# Patient Record
Sex: FEMALE | Race: ASIAN | NOT HISPANIC OR LATINO | URBAN - METROPOLITAN AREA
[De-identification: names, ages, dates, MRNs, and addresses within clinical notes are randomized per-mention and may not be internally consistent; named-entity substitution may affect disease eponyms.]

---

## 2024-01-12 PROBLEM — Z00.00 ENCOUNTER FOR PREVENTIVE HEALTH EXAMINATION: Status: ACTIVE | Noted: 2024-01-12

## 2024-01-19 DIAGNOSIS — Z86.39 PERSONAL HISTORY OF OTHER ENDOCRINE, NUTRITIONAL AND METABOLIC DISEASE: ICD-10-CM

## 2024-01-22 ENCOUNTER — OUTPATIENT (OUTPATIENT)
Dept: OUTPATIENT SERVICES | Facility: HOSPITAL | Age: 89
LOS: 1 days | End: 2024-01-22
Payer: MEDICARE

## 2024-01-22 ENCOUNTER — APPOINTMENT (OUTPATIENT)
Dept: CT IMAGING | Facility: HOSPITAL | Age: 89
End: 2024-01-22
Payer: MEDICARE

## 2024-01-22 ENCOUNTER — APPOINTMENT (OUTPATIENT)
Dept: CARDIOTHORACIC SURGERY | Facility: CLINIC | Age: 89
End: 2024-01-22
Payer: MEDICARE

## 2024-01-22 ENCOUNTER — NON-APPOINTMENT (OUTPATIENT)
Age: 89
End: 2024-01-22

## 2024-01-22 ENCOUNTER — RESULT REVIEW (OUTPATIENT)
Age: 89
End: 2024-01-22

## 2024-01-22 ENCOUNTER — APPOINTMENT (OUTPATIENT)
Dept: ULTRASOUND IMAGING | Facility: HOSPITAL | Age: 89
End: 2024-01-22

## 2024-01-22 VITALS
TEMPERATURE: 97.4 F | SYSTOLIC BLOOD PRESSURE: 159 MMHG | DIASTOLIC BLOOD PRESSURE: 67 MMHG | HEIGHT: 59 IN | OXYGEN SATURATION: 98 % | HEART RATE: 83 BPM | BODY MASS INDEX: 23.99 KG/M2 | WEIGHT: 119 LBS | RESPIRATION RATE: 15 BRPM

## 2024-01-22 DIAGNOSIS — Z78.9 OTHER SPECIFIED HEALTH STATUS: ICD-10-CM

## 2024-01-22 DIAGNOSIS — Z86.59 PERSONAL HISTORY OF OTHER MENTAL AND BEHAVIORAL DISORDERS: ICD-10-CM

## 2024-01-22 DIAGNOSIS — M19.90 UNSPECIFIED OSTEOARTHRITIS, UNSPECIFIED SITE: ICD-10-CM

## 2024-01-22 DIAGNOSIS — Z86.79 PERSONAL HISTORY OF OTHER DISEASES OF THE CIRCULATORY SYSTEM: ICD-10-CM

## 2024-01-22 LAB
ALBUMIN SERPL ELPH-MCNC: 3.9 G/DL — SIGNIFICANT CHANGE UP (ref 3.3–5)
ALP SERPL-CCNC: 35 U/L — LOW (ref 40–120)
ALT FLD-CCNC: 15 U/L — SIGNIFICANT CHANGE UP (ref 10–45)
ANION GAP SERPL CALC-SCNC: 10 MMOL/L — SIGNIFICANT CHANGE UP (ref 5–17)
APTT BLD: 28.6 SEC — SIGNIFICANT CHANGE UP (ref 24.5–35.6)
AST SERPL-CCNC: 24 U/L — SIGNIFICANT CHANGE UP (ref 10–40)
BASOPHILS # BLD AUTO: 0.04 K/UL — SIGNIFICANT CHANGE UP (ref 0–0.2)
BASOPHILS NFR BLD AUTO: 0.6 % — SIGNIFICANT CHANGE UP (ref 0–2)
BILIRUB SERPL-MCNC: 0.6 MG/DL — SIGNIFICANT CHANGE UP (ref 0.2–1.2)
BUN SERPL-MCNC: 20 MG/DL — SIGNIFICANT CHANGE UP (ref 7–23)
CALCIUM SERPL-MCNC: 9.7 MG/DL — SIGNIFICANT CHANGE UP (ref 8.4–10.5)
CHLORIDE SERPL-SCNC: 106 MMOL/L — SIGNIFICANT CHANGE UP (ref 96–108)
CO2 SERPL-SCNC: 25 MMOL/L — SIGNIFICANT CHANGE UP (ref 22–31)
CREAT SERPL-MCNC: 1.14 MG/DL — SIGNIFICANT CHANGE UP (ref 0.5–1.3)
EGFR: 47 ML/MIN/1.73M2 — LOW
EOSINOPHIL # BLD AUTO: 0.08 K/UL — SIGNIFICANT CHANGE UP (ref 0–0.5)
EOSINOPHIL NFR BLD AUTO: 1.3 % — SIGNIFICANT CHANGE UP (ref 0–6)
GLUCOSE SERPL-MCNC: 115 MG/DL — HIGH (ref 70–99)
HCT VFR BLD CALC: 32.4 % — LOW (ref 34.5–45)
HGB BLD-MCNC: 10.7 G/DL — LOW (ref 11.5–15.5)
IMM GRANULOCYTES NFR BLD AUTO: 0.8 % — SIGNIFICANT CHANGE UP (ref 0–0.9)
INR BLD: 0.95 — SIGNIFICANT CHANGE UP (ref 0.85–1.18)
LYMPHOCYTES # BLD AUTO: 1.61 K/UL — SIGNIFICANT CHANGE UP (ref 1–3.3)
LYMPHOCYTES # BLD AUTO: 26.1 % — SIGNIFICANT CHANGE UP (ref 13–44)
MCHC RBC-ENTMCNC: 31.6 PG — SIGNIFICANT CHANGE UP (ref 27–34)
MCHC RBC-ENTMCNC: 33 GM/DL — SIGNIFICANT CHANGE UP (ref 32–36)
MCV RBC AUTO: 95.6 FL — SIGNIFICANT CHANGE UP (ref 80–100)
MONOCYTES # BLD AUTO: 0.51 K/UL — SIGNIFICANT CHANGE UP (ref 0–0.9)
MONOCYTES NFR BLD AUTO: 8.3 % — SIGNIFICANT CHANGE UP (ref 2–14)
NEUTROPHILS # BLD AUTO: 3.88 K/UL — SIGNIFICANT CHANGE UP (ref 1.8–7.4)
NEUTROPHILS NFR BLD AUTO: 62.9 % — SIGNIFICANT CHANGE UP (ref 43–77)
NRBC # BLD: 0 /100 WBCS — SIGNIFICANT CHANGE UP (ref 0–0)
NT-PROBNP SERPL-SCNC: 551 PG/ML — HIGH (ref 0–300)
PLATELET # BLD AUTO: 169 K/UL — SIGNIFICANT CHANGE UP (ref 150–400)
POTASSIUM SERPL-MCNC: 4.1 MMOL/L — SIGNIFICANT CHANGE UP (ref 3.5–5.3)
POTASSIUM SERPL-SCNC: 4.1 MMOL/L — SIGNIFICANT CHANGE UP (ref 3.5–5.3)
PROT SERPL-MCNC: 7 G/DL — SIGNIFICANT CHANGE UP (ref 6–8.3)
PROTHROM AB SERPL-ACNC: 10.8 SEC — SIGNIFICANT CHANGE UP (ref 9.5–13)
RBC # BLD: 3.39 M/UL — LOW (ref 3.8–5.2)
RBC # FLD: 12.7 % — SIGNIFICANT CHANGE UP (ref 10.3–14.5)
SODIUM SERPL-SCNC: 141 MMOL/L — SIGNIFICANT CHANGE UP (ref 135–145)
WBC # BLD: 6.17 K/UL — SIGNIFICANT CHANGE UP (ref 3.8–10.5)
WBC # FLD AUTO: 6.17 K/UL — SIGNIFICANT CHANGE UP (ref 3.8–10.5)

## 2024-01-22 PROCEDURE — 75573 CT HRT C+ STRUX CGEN HRT DS: CPT

## 2024-01-22 PROCEDURE — 71275 CT ANGIOGRAPHY CHEST: CPT | Mod: 26,MH

## 2024-01-22 PROCEDURE — 86901 BLOOD TYPING SEROLOGIC RH(D): CPT

## 2024-01-22 PROCEDURE — 85610 PROTHROMBIN TIME: CPT

## 2024-01-22 PROCEDURE — 71275 CT ANGIOGRAPHY CHEST: CPT

## 2024-01-22 PROCEDURE — 83880 ASSAY OF NATRIURETIC PEPTIDE: CPT

## 2024-01-22 PROCEDURE — 36415 COLL VENOUS BLD VENIPUNCTURE: CPT

## 2024-01-22 PROCEDURE — 99204 OFFICE O/P NEW MOD 45 MIN: CPT

## 2024-01-22 PROCEDURE — 85730 THROMBOPLASTIN TIME PARTIAL: CPT

## 2024-01-22 PROCEDURE — 74174 CTA ABD&PLVS W/CONTRAST: CPT

## 2024-01-22 PROCEDURE — 99203 OFFICE O/P NEW LOW 30 MIN: CPT

## 2024-01-22 PROCEDURE — 85025 COMPLETE CBC W/AUTO DIFF WBC: CPT

## 2024-01-22 PROCEDURE — 75573 CT HRT C+ STRUX CGEN HRT DS: CPT | Mod: 26,MH

## 2024-01-22 PROCEDURE — 93880 EXTRACRANIAL BILAT STUDY: CPT

## 2024-01-22 PROCEDURE — 80053 COMPREHEN METABOLIC PANEL: CPT

## 2024-01-22 PROCEDURE — 74174 CTA ABD&PLVS W/CONTRAST: CPT | Mod: 26,MH

## 2024-01-22 PROCEDURE — 86900 BLOOD TYPING SEROLOGIC ABO: CPT

## 2024-01-22 PROCEDURE — 86850 RBC ANTIBODY SCREEN: CPT

## 2024-01-22 PROCEDURE — 93880 EXTRACRANIAL BILAT STUDY: CPT | Mod: 26

## 2024-01-23 PROBLEM — M19.90 OSTEOARTHRITIS: Status: RESOLVED | Noted: 2024-01-23 | Resolved: 2024-01-23

## 2024-01-23 PROBLEM — Z86.79 HISTORY OF HYPERTENSION: Status: RESOLVED | Noted: 2024-01-19 | Resolved: 2024-01-23

## 2024-01-23 PROBLEM — Z86.59 HISTORY OF ANXIETY: Status: RESOLVED | Noted: 2024-01-23 | Resolved: 2024-01-23

## 2024-01-23 PROBLEM — Z78.9 NON-SMOKER: Status: ACTIVE | Noted: 2024-01-23

## 2024-01-23 RX ORDER — FAMOTIDINE 40 MG/1
40 TABLET, FILM COATED ORAL DAILY
Refills: 0 | Status: ACTIVE | COMMUNITY
Start: 2024-01-05

## 2024-01-23 RX ORDER — ATORVASTATIN CALCIUM 40 MG/1
40 TABLET, FILM COATED ORAL DAILY
Refills: 0 | Status: ACTIVE | COMMUNITY
Start: 2023-09-30

## 2024-01-23 RX ORDER — DOCUSATE SODIUM 100 MG/1
100 CAPSULE ORAL TWICE DAILY
Refills: 0 | Status: ACTIVE | COMMUNITY

## 2024-01-23 RX ORDER — LINAGLIPTIN 5 MG/1
5 TABLET, FILM COATED ORAL DAILY
Refills: 0 | Status: ACTIVE | COMMUNITY
Start: 2023-08-19

## 2024-01-23 RX ORDER — SENNOSIDES 8.6 MG TABLETS 8.6 MG/1
8.6 TABLET ORAL
Refills: 0 | Status: ACTIVE | COMMUNITY

## 2024-01-23 RX ORDER — FLUOROMETHOLONE ACETATE 1 MG/ML
0.1 SUSPENSION/ DROPS OPHTHALMIC
Qty: 5 | Refills: 0 | Status: ACTIVE | COMMUNITY
Start: 2023-11-06

## 2024-01-23 RX ORDER — FOLIC ACID 1 MG/1
1 TABLET ORAL DAILY
Refills: 0 | Status: ACTIVE | COMMUNITY

## 2024-01-23 RX ORDER — NAPROXEN 375 MG/1
375 TABLET ORAL
Refills: 0 | Status: ACTIVE | COMMUNITY
Start: 2023-09-30

## 2024-01-23 RX ORDER — ACARBOSE 25 MG/1
25 TABLET ORAL 3 TIMES DAILY
Refills: 0 | Status: ACTIVE | COMMUNITY

## 2024-01-24 NOTE — ASSESSMENT
[FreeTextEntry1] : 88 F with PHM of anxiety, HTN, HLD, T2DM, PVD s/p vein procedure (in [place], 2010), chronic low back pain, HFpEF and symptomatic critical aortic stenosis (TTE 1/6/24: LVEF 55-60%, MG 61.7, PV 4.68, ANUP 0.6) referred for surgical consultation of treatment options for aortic stenosis. Patient is clinically stable; NYHA Class III. Dr. Garcia have reviewed patient's history and pertinent clinical data. Echocardiogram from 1/2/24 showed critical aortic stenosis. Treatment options including surgical, transcatheter, and medical therapy were discussed with patient and her family.  Dr. Garcia deemed patient a high surgical risk due to age, frailty and comorbidities, recommends treating her critical aortic stenosis with TAVR procedure. The TAVR procedure including the risks (including but not limited to bleeding, infection, stroke, need for permanent pacemaker, heart attack, and death), benefits and alternatives were discussed at length with patient and his daughter. All questions were answered.

## 2024-01-24 NOTE — RESULTS/DATA
[TextEntry] : TTE 1/6/24: LVEF 55-60%, grade I diastolic dysfunction, normal RV function, trileaflet aortic valve, severe aortic stenosis, PG 87.8, MG 61.7, PV 4.68, ANUP (VTI) 0.6cm2, structurally normal mitral valve with no regurgitation, structurally normal tricuspid valve with no regurgitation, structurally normal pulmonic valve with no regurgitation, no e/o pericardial effusion.   EKG 1/6/24: SR 89, HI 145ms, QRS 98ms, QTc 395  KCCQ done on 1/22/4  5MWT 1/22/24: 50:37s, 49:40s, 50:10s

## 2024-01-24 NOTE — ASSESSMENT
[FreeTextEntry1] : 88 F with PHM of HTN, HLD, T2DM, HFpEF and critical aortic stenosis (TTE 1/6/24: LVEF 55-60%, MG 61.7, PV 4.68, ANUP 0.6) referred by Dr. Aparna Oliveira for consultation of treatment options for aortic stenosis. Patient is clinically stable; NYHA Class III. Dr. Padilla have independently seen and evaluated the patient in this face-to-face encounter. Dr. Padilla have reviewed patient's history and pertinent clinical data. Echocardiogram on 1/6/24 reviewed, which showed critical AS. Treatment options including surgical AVR, TAVR and medical therapy were discussed. In Dr. Padilla's estimation, patient is high surgical risk. Plan reviewed with multidisciplinary heart valve team with the decision to proceed TAVR. The TAVR procedure including the risks (including but not limited to bleeding, infection, stroke, need for permanent pacemaker, heart attack, and death), and benefits were discussed at length with patient and her family. In order to determine TAVR candidacy, the following testing are needed: TAVR CTs and carotid US. Patient to return in [facility] after testing. All questions were answered.

## 2024-01-24 NOTE — RESULTS/DATA
[TextEntry] : TTE 1/6/24: LVEF 55-60%, grade I diastolic dysfunction, normal RV function, trileaflet aortic valve, severe aortic stenosis, PG 87.8, MG 61.7, PV 4.68, ANUP (VTI) 0.6cm2, structurally normal mitral valve with no regurgitation, structurally normal tricuspid valve with no regurgitation, structurally normal pulmonic valve with no regurgitation, no e/o pericardial effusion.   EKG 1/6/24: SR 89, [place] 145ms, QRS 98ms, QTc 395  KCCQ done on 1/22/4  5MWT 1/22/24: 50:37s, 49:40s, 50:10s

## 2024-01-24 NOTE — REVIEW OF SYSTEMS
[Feeling Fatigued] : feeling fatigued [SOB] : shortness of breath [Chest Discomfort] : chest discomfort [Palpitations] : palpitations [Joint Stiffness] : joint stiffness [Joint Pain] : joint pain [Dizziness] : dizziness [Anxiety] : anxiety [Negative] : Heme/Lymph [Fever] : no fever [Headache] : no headache [Chills] : no chills [Dyspnea on exertion] : not dyspnea during exertion [Lower Ext Edema] : no extremity edema [Leg Claudication] : no intermittent leg claudication [Orthopnea] : no orthopnea [PND] : no PND [Syncope] : no syncope [Cough] : no cough [Wheezing] : no wheezing [Abdominal Pain] : no abdominal pain [Nausea] : no nausea [Vomiting] : no vomiting [Dysuria] : no dysuria [Rash] : no rash [Tremor] : no tremor was seen [Numbness (Hypoesthesia)] : no numbness [Tingling (Paresthesia)] : no tingling [Convulsions] : no convulsions [Weakness] : no weakness [Limb Weakness (Paresis)] : no limb weakness (Paresis) [Speech Disturbance] : no speech disturbance [Confusion] : no confusion was observed [Memory Lapses Or Loss] : no memory lapses or loss [Depression] : no depression

## 2024-01-24 NOTE — PHYSICAL EXAM
[Well Nourished] : well nourished [No Acute Distress] : no acute distress [Frail] : frail [Normal Conjunctiva] : normal conjunctiva [Normal S1, S2] : normal S1, S2 [No Rub] : no rub [No Gallop] : no gallop [Clear Lung Fields] : clear lung fields [Good Air Entry] : good air entry [No Respiratory Distress] : no respiratory distress  [Soft] : abdomen soft [Non Tender] : non-tender [No Masses/organomegaly] : no masses/organomegaly [Normal Bowel Sounds] : normal bowel sounds [Normal Gait] : normal gait [No Edema] : no edema [No Cyanosis] : no cyanosis [No Clubbing] : no clubbing [No Varicosities] : no varicosities [No Rash] : no rash [No Skin Lesions] : no skin lesions [Moves all extremities] : moves all extremities [No Focal Deficits] : no focal deficits [Normal Speech] : normal speech [Alert and Oriented] : alert and oriented [Normal memory] : normal memory [de-identified] : supple  [de-identified] : III/VI BRITTON

## 2024-01-24 NOTE — PHYSICAL EXAM
[Well Nourished] : well nourished [No Acute Distress] : no acute distress [Frail] : frail [Normal S1, S2] : normal S1, S2 [Normal Conjunctiva] : normal conjunctiva [No Gallop] : no gallop [Clear Lung Fields] : clear lung fields [No Rub] : no rub [Good Air Entry] : good air entry [No Respiratory Distress] : no respiratory distress  [Soft] : abdomen soft [Non Tender] : non-tender [No Masses/organomegaly] : no masses/organomegaly [Normal Bowel Sounds] : normal bowel sounds [No Edema] : no edema [Normal Gait] : normal gait [No Cyanosis] : no cyanosis [No Clubbing] : no clubbing [No Varicosities] : no varicosities [No Skin Lesions] : no skin lesions [No Rash] : no rash [Moves all extremities] : moves all extremities [No Focal Deficits] : no focal deficits [Normal Speech] : normal speech [Alert and Oriented] : alert and oriented [Normal memory] : normal memory [de-identified] : supple  [de-identified] : III/VI BRITTON

## 2024-01-24 NOTE — HISTORY OF PRESENT ILLNESS
[FreeTextEntry1] : 88 Y Cantonese speaking female referred by Dr. Aparna Oliveira for consultation of treatment options for aortic stenosis.   Patient has a PHM of anxiety, HTN, HLD, T2DM, PVD s/p vein procedure (in [place], 2010), chronic low back pain, HFpEF and symptomatic critical aortic stenosis (TTE 1/6/24: LVEF 55-60%, MG 61.7, PV 4.68, ANUP 0.6) Denies prior CVA, MI, CAD, pulmonary or liver disease, prior clotting disorder, or prior bleeding diathesis.   Patient reports that she was at her baseline state of health until 12/31/23 when she started experiencing chest pain with minimal exertion. was referred to a cardiologist. TTE on 1/6/24 showed normal BiV function, grade I diastolic dysfunction, severe AS (MG 61, PV 4.68). Given findings and symptoms, patient was referred to [facility] for TAVR evaluation. Per patient's family, patient has an exercise tolerance of 1 block limited by bilateral leg pain and chest pain. Patient also reports episodes of dizziness occurring at random, which has been going for 20 yrs with recent increased in frequency, occasional brief palpitations with associated shortness of breath, and progressive increased fatigability. Denies any recent syncope, orthopnea, PND, LE edema, abdominal bloating, F/C/dysuria, or recent hospitalizations. She denies any active dental issue, only has 2 bottom teeth, otherwise all are dentures. 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     1/18/24 labs reviewed: WBC 6.0 Hgb 11 Hct 33.5 Plt 160 Cr 1.13 (eGFR 47)

## 2024-01-24 NOTE — HISTORY OF PRESENT ILLNESS
[Diabetes Mellitus] : Diabetes Mellitus [Oral] : controlled with oral diabetes medication [Dyslipidemia] : Dyslipidemia [Hypertension] : Hypertension [CCA III] : CCA IIII [Class III] : Class III [Prior Heart Failure] : Prior Heart Failure [FreeTextEntry1] : 88 F with PHM of anxiety, HTN, HLD, T2DM, PVD s/p vein procedure (in [place], 2010), chronic low back pain, HFpEF and symptomatic critical aortic stenosis (TTE 1/6/24: LVEF 55-60%, MG 61.7, PV 4.68, ANUP 0.6) referred for surgical consultation of treatment options for aortic stenosis.   Patient reports that she was at her baseline state of health until 12/31/23 when she started experiencing chest pain with minimal exertion. was referred to a cardiologist. TTE on 1/6/24 showed normal BiV function, grade I diastolic dysfunction, severe AS (MG 61, PV 4.68).  Currently, patient reports having an exercise tolerance of 1 block limited by bilateral leg pain and chest pain. Patient also reports episodes of dizziness occurring at random, which has been going for 20 yrs with recent increased in frequency, occasional brief palpitations with associated shortness of breath, and progressive increased fatigability. Denies any recent syncope, orthopnea, PND, LE edema, abdominal bloating, F/C/dysuria, or recent hospitalizations. She denies any active dental issue, only has 2 bottom teeth, otherwise all are dentures. 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  1/18/24 labs reviewed: WBC 6.0 Hgb 11 Hct 33.5 Plt 160 Cr 1.13 (eGFR 47) [Dialysis] : no dialysis [Infectious Endocarditis] : no infectious endocarditis [Home Oxygen] : no home oxygen use [Inhaled Medication Therapy] : no inhaled medication therapy [Sleep Apnea] : no sleep apnea [Liver Disease] : no liver disease [Immunocompromise Present] : not immunocompromised [Peripheral Artery Disease] : no peripheral artery disease [Unresponsive Neurologic State] : not in a unresponsive neurologic state [Syncope] : no syncope [Cerebrovascular Disease] : no cerebrovascular disease [Prior CVA] : with no prior CVA [Prior Myocardial Infarction] : No prior myocardial infarction [V tach / V fib] :  but no V tach/V fib [2nd Degree Heart Block] : no second degree heart block [Sick Sinus Syndrome] : no sick sinus syndrome [3rd Degree Heart Block] : no third degree heart block [A fib / A flutter] : no A fib or A flutter

## 2024-01-24 NOTE — HISTORY OF PRESENT ILLNESS
[FreeTextEntry1] : 88 Y Cantonese speaking female referred by Dr. Aparna Oliveira for consultation of treatment options for aortic stenosis.   Patient has a PHM of anxiety, HTN, HLD, T2DM, PVD s/p vein procedure (in [place], 2010), chronic low back pain, HFpEF and symptomatic critical aortic stenosis (TTE 1/6/24: LVEF 55-60%, MG 61.7, PV 4.68, ANUP 0.6) Denies prior CVA, MI, CAD, pulmonary or liver disease, prior clotting disorder, or prior bleeding diathesis.   Patient reports that she was at her baseline state of health until 12/31/23 when she started experiencing chest pain with minimal exertion. was referred to a cardiologist. TTE on 1/6/24 showed normal BiV function, grade I diastolic dysfunction, severe AS (MG 61, PV 4.68). Given findings and symptoms, patient was referred to [facility] for TAVR evaluation. Per patient's family, patient has an exercise tolerance of 1 block limited by bilateral leg pain and chest pain. Patient also reports episodes of dizziness occurring at random, which has been going for 20 yrs with recent increased in frequency, occasional brief palpitations with associated shortness of breath, and progressive increased fatigability. Denies any recent syncope, orthopnea, PND, LE edema, abdominal bloating, F/C/dysuria, or recent hospitalizations. She denies any active dental issue, only has 2 bottom teeth, otherwise all are dentures. 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     1/18/24 labs reviewed: WBC 6.0 Hgb 11 Hct 33.5 Plt 160 Cr 1.13 (eGFR 47)

## 2024-01-24 NOTE — REASON FOR VISIT
[Structural Heart and Valve Disease] : structural heart and valve disease [FreeTextEntry1] : Patient is accompanied by her daughter (Shanta Main) and granddaughter (Humera). Cantonese  offered, patient and family prefer granddaughter, Humera who is an RN at [employer], to perform interpretation.

## 2024-01-24 NOTE — REASON FOR VISIT
[Structural Heart and Valve Disease] : structural heart and valve disease [FreeTextEntry1] : Patient is accompanied by her daughter (Shanta Main) and granddaughter (Humera). Cantonese  offered, patient and family prefer granddaughter, Humera who is an RN at [employer], to perform interpretation.

## 2024-01-24 NOTE — PHYSICAL EXAM
[Well Nourished] : well nourished [Frail] : frail [No Acute Distress] : no acute distress [Normal S1, S2] : normal S1, S2 [Normal Conjunctiva] : normal conjunctiva [No Gallop] : no gallop [Clear Lung Fields] : clear lung fields [No Rub] : no rub [No Respiratory Distress] : no respiratory distress  [Good Air Entry] : good air entry [Non Tender] : non-tender [Soft] : abdomen soft [Normal Bowel Sounds] : normal bowel sounds [No Masses/organomegaly] : no masses/organomegaly [Normal Gait] : normal gait [No Edema] : no edema [No Clubbing] : no clubbing [No Cyanosis] : no cyanosis [No Varicosities] : no varicosities [No Rash] : no rash [No Skin Lesions] : no skin lesions [No Focal Deficits] : no focal deficits [Moves all extremities] : moves all extremities [Alert and Oriented] : alert and oriented [Normal Speech] : normal speech [Normal memory] : normal memory [de-identified] : supple  [de-identified] : III/VI BRITTON

## 2024-01-24 NOTE — PLAN
[TextEntry] : # symptomatic critical AS -Labs today -CTA TAVR protocol -Recommend proceeding with TAVR

## 2024-01-24 NOTE — PLAN
[TextEntry] : # symptomatic critical aortic stenosis  - continue current medication regimen.  - follow up with Dr. Aparna Oliveira for close cardiology care.  - TAVR testing today: TAVR CTs and carotid US.  - CV surgeon evaluation today.  - Return to [facility] clinic with Dr. Padilla after testing or as needed.   I, Dr. Kartik Padilla, personally performed the evaluation and management (E/M) services for this new patient. That E/M includes conducting the clinically appropriate initial history &/or exam, assessing all conditions, and establishing the plan of care. Today, my BISMARK, noted herewith, was here to observe my evaluation and management service for this patient & follow plan of care established by me going forward.

## 2024-01-30 ENCOUNTER — NON-APPOINTMENT (OUTPATIENT)
Age: 89
End: 2024-01-30

## 2024-02-14 VITALS
TEMPERATURE: 99 F | HEART RATE: 68 BPM | OXYGEN SATURATION: 99 % | WEIGHT: 126.1 LBS | HEIGHT: 59 IN | RESPIRATION RATE: 17 BRPM

## 2024-02-14 RX ORDER — SODIUM CHLORIDE 9 MG/ML
1000 INJECTION INTRAMUSCULAR; INTRAVENOUS; SUBCUTANEOUS
Refills: 0 | Status: DISCONTINUED | OUTPATIENT
Start: 2024-02-15 | End: 2024-02-29

## 2024-02-14 RX ORDER — CHLORHEXIDINE GLUCONATE 213 G/1000ML
1 SOLUTION TOPICAL ONCE
Refills: 0 | Status: DISCONTINUED | OUTPATIENT
Start: 2024-02-15 | End: 2024-02-29

## 2024-02-14 NOTE — H&P ADULT - HISTORY OF PRESENT ILLNESS
Cardiologist: Dr. Aparna Oliveira  Escort:  Pharmacy:    87 yo Cantonese Speaking F with a PMH of HTN, HLD, DM Type II, PVD s/p vein procedure in 2010, chronic lower back pain, HFpEF and symptomatic critical aortic stenosis who presented to outpatient cardiologist Dr. Oliveira after she began experiencing chest pain and SOB on exertion on 1 block, relieved w/ rest. Also endorses dizziness and brief palpitations. Denies diaphoresis, orthopnea/PND, abdominal pain, N/V/D, urinary sx, BRBPR, hematuria, melena, LE swelling. Work up revealed severe AS (report below) and then referred to Dr. Padilla for TAVR evaluation. In light of patient's risk factors, CCS angina class ___ sx and new severe aortic stenosis patient is now referred for cardiac catheterization as part of pre op work up for TAVR.    TTE 1/6/24: normal BiV function, grade I DD, severe AS (MG 61.7, PV 4.68, ANUP 0.6)  CCTA Cardiac 1/22/24: severe calcification of aortic valve leaflets, stenosis severity is indeterminate in the mid-distal LAD, O2, RCA, remaining segments non obstructive, multiple calcified pulmonary nodules compatible with old granulomatous disease Cardiologist: Dr. Aparna Oliveira  Escort: Daughter and granddaughter  Pharmacy: [facility] ([address] [place] [address])    89 yo Cantonese Speaking F with a PMH of HTN, HLD, DM Type II, PVD s/p vein procedure in 2010, chronic lower back pain, HFpEF and symptomatic critical aortic stenosis who presented to outpatient cardiologist Dr. Oliveira after she began experiencing chest pain and SOB on exertion on 1 block, relieved w/ rest. Also endorses dizziness and brief palpitations. Denies diaphoresis, orthopnea/PND, abdominal pain, N/V/D, urinary sx, BRBPR, hematuria, melena, LE swelling. Work up revealed severe AS (report below) and then referred to Dr. Padilla for TAVR evaluation.     TTE 1/6/24: normal BiV function, grade I DD, severe AS (MG 61.7, PV 4.68, ANUP 0.6)  CCTA Cardiac 1/22/24: severe calcification of aortic valve leaflets, stenosis severity is indeterminate in the mid-distal LAD, O2, RCA, remaining segments non obstructive, multiple calcified pulmonary nodules compatible with old granulomatous disease    In light of patient's risk factors, CCS anginal class III sx and new severe aortic stenosis patient is now referred for cardiac catheterization with possible PCI if clinically indicated as part of pre-op work up for TAVR. Cardiologist: Dr. Aparna Oliveira  Escort: Daughter and granddaughter  Pharmacy: [facility] ([address] [place] [address])    88F Cantonese Speaking with a PMH of HTN, HLD, DM Type II, PVD s/p vein procedure in 2010, chronic lower back pain, HFpEF and symptomatic critical aortic stenosis who presented to outpatient cardiologist Dr. Oliveira after she began experiencing chest pain and SOB on exertion on 1 block, relieved w/ rest. Also endorses dizziness and brief palpitations. Denies diaphoresis, orthopnea/PND, abdominal pain, N/V/D, urinary sx, BRBPR, hematuria, melena, LE swelling. Work up revealed severe AS (report below) and then referred to Dr. Padilla for TAVR evaluation.     TTE 1/6/24: normal BiV function, grade I DD, severe AS (MG 61.7, PV 4.68, ANUP 0.6)  CCTA Cardiac 1/22/24: severe calcification of aortic valve leaflets, stenosis severity is indeterminate in the mid-distal LAD, O2, RCA, remaining segments non obstructive, multiple calcified pulmonary nodules compatible with old granulomatous disease    In light of patient's risk factors, CCS anginal class III sx and new severe aortic stenosis patient is now referred for cardiac catheterization with possible PCI if clinically indicated as part of pre-op work up for TAVR. Cardiologist: Dr. Aparna Oliveira  Escort: Daughter and granddaughter  Pharmacy: [facility] ([address] [place] [address])    88F Cantonese Speaking with a PMH of HTN, HLD, DM Type II, PVD s/p vein procedure in 2010, chronic lower back pain, HFpEF and symptomatic critical aortic stenosis who presented to outpatient cardiologist Dr. Oliveira after she began experiencing chest pain and SOB on exertion on 1 block, relieved w/ rest. Also endorses dizziness and brief palpitations. Denies diaphoresis, orthopnea/PND, abdominal pain, N/V/D, urinary sx, BRBPR, hematuria, melena, LE swelling. Work up revealed severe AS (report below) and is planned for TAVR with Dr. James.  During pre-op evaluation, CCTA showed severe coronary calcification but can not rule out severe CAD.  She is here for LHC/possible BAV+PCI.    TTE 1/6/24: normal BiV function, grade I DD, severe AS (MG 61.7, PV 4.68, ANUP 0.6)  CCTA Cardiac 1/22/24: severe calcification of aortic valve leaflets, stenosis severity is indeterminate in the mid-distal LAD, O2, RCA, remaining segments non obstructive, multiple calcified pulmonary nodules compatible with old granulomatous disease    In light of patient's risk factors, CCS anginal class III sx and new severe aortic stenosis patient is now referred for cardiac catheterization with possible PCI if clinically indicated as part of pre-op work up for TAVR. Cardiologist: Dr. Aparna Oliveira  Escort: Daughter and granddaughter  Pharmacy: [facility] ([address] [place] [address])    88F Cantonese Speaking with a PMH of HTN, HLD, DM Type II, PVD s/p vein procedure in 2010, chronic lower back pain, HFpEF and symptomatic critical aortic stenosis who presented to outpatient cardiologist Dr. Oliveira after she began experiencing chest pain and SOB on exertion on 1 block, relieved w/ rest. Also endorses dizziness and brief palpitations. Work up revealed severe AS (report below) and is planned for TAVR with Dr. James.  During pre-op evaluation, CCTA showed severe coronary calcification but can not rule out severe CAD.  She is here for LHC/possible BAV+PCI. Denies diaphoresis, orthopnea/PND, abdominal pain, N/V/D, urinary sx, BRBPR, hematuria, melena, LE swelling.    TTE 1/6/24: normal BiV function, grade I DD, severe AS (MG 61.7, PV 4.68, ANUP 0.6)  CCTA Cardiac 1/22/24: severe calcification of aortic valve leaflets, stenosis severity is indeterminate in the mid-distal LAD, O2, RCA, remaining segments non obstructive, multiple calcified pulmonary nodules compatible with old granulomatous disease    In light of patient's risk factors, CCS anginal class III sx and new severe aortic stenosis patient is now referred for cardiac catheterization with possible PCI if clinically indicated as part of pre-op work up for TAVR. Cardiologist: Dr. Aparna Oliveira  Escort: Daughter and granddaughter  Pharmacy: [facility] ([address] [place] [address])    88F Cantonese Speaking with a PMH of HTN, HLD, DM Type II, PVD s/p vein procedure in 2010, chronic lower back pain, HFpEF and symptomatic critical aortic stenosis who presented to outpatient cardiologist Dr. Oliveira after she began experiencing chest pain and MCKEON with 1 block, relieved w/ rest. Also endorses dizziness and brief palpitations. Work up revealed severe AS (report below) and is planned for TAVR with Dr. James.  During pre-op evaluation, CCTA showed severe coronary calcification but can not rule out severe CAD.  She is here for LHC/possible BAV+PCI. Denies diaphoresis, orthopnea/PND, abdominal pain, N/V/D, urinary sx, BRBPR, hematuria, melena, LE swelling.    TTE 1/6/24: normal BiV function, grade I DD, severe AS (MG 61.7, PV 4.68, ANUP 0.6)  CCTA Cardiac 1/22/24: severe calcification of aortic valve leaflets, stenosis severity is indeterminate in the mid-distal LAD, O2, RCA, remaining segments non obstructive, multiple calcified pulmonary nodules compatible with old granulomatous disease    In light of patient's risk factors, CCS anginal class III sx and new severe aortic stenosis, patient is now referred for cardiac catheterization with possible PCI if clinically indicated as part of pre-op work up for TAVR.

## 2024-02-14 NOTE — H&P ADULT - NSHPLABSRESULTS_GEN_ALL_CORE
11.4   7.24  )-----------( 169      ( 15 Feb 2024 07:47 )             34.9       02-15    143  |  108  |  23  ----------------------------<  150<H>  3.9   |  27  |  1.28    Ca    9.7      15 Feb 2024 07:47  Mg     1.7     02-15    TPro  7.6  /  Alb  4.1  /  TBili  0.5  /  DBili  x   /  AST  22  /  ALT  13  /  AlkPhos  36<L>  02-15      PT/INR - ( 15 Feb 2024 07:47 )   PT: 10.3 sec;   INR: 0.90          PTT - ( 15 Feb 2024 07:47 )  PTT:28.2 sec    CARDIAC MARKERS ( 15 Feb 2024 07:47 )  x     / x     / 61 U/L / x     / 2.2 ng/mL        Urinalysis Basic - ( 15 Feb 2024 07:47 )    Color: x / Appearance: x / SG: x / pH: x  Gluc: 150 mg/dL / Ketone: x  / Bili: x / Urobili: x   Blood: x / Protein: x / Nitrite: x   Leuk Esterase: x / RBC: x / WBC x   Sq Epi: x / Non Sq Epi: x / Bacteria: x        EKG: NSR @ 89 bpm, biphasic TW V1 and V3

## 2024-02-14 NOTE — H&P ADULT - ASSESSMENT
87 yo Cantonese Speaking F with a PMH of HTN, HLD, DM Type II, PVD s/p vein procedure in 2010, chronic lower back pain, HFpEF and symptomatic critical aortic stenosis, pt is now referred for cardiac catheterization with possible PCI if clinically indicated as part of pre-op work up for TAVR.    TTE 1/6/24: normal BiV function, grade I DD, severe AS (MG 61.7, PV 4.68, ANUP 0.6)  CCTA Cardiac 1/22/24: severe calcification of aortic valve leaflets, stenosis severity is indeterminate in the mid-distal LAD, O2, RCA, remaining segments non obstructive, multiple calcified pulmonary nodules compatible with old granulomatous disease    ASA III          Mallampati III  Patient is a candidate for sedation: yes  Sedation: Moderate    - Load: As discussed with Dr. Oliveira - pt should be loaded.  mg PO x1 and Plavix 75 mg x1 (pt endorses compliance and last dose was yesterday).   - IV hydration: NS 50 cc/hr x 2 hours due to severe valvular disease  - K 3.9 - repleted with potassium chloride 20 mEq x1  - Mg 1.7 - repleted with 1g magnesium sulfate IV x 1  - Informed consent obtained by fellow.    Risks & benefits of procedure and alternative therapy have been explained to the patient including but not limited to: allergic reaction, bleeding w/possible need for blood transfusion, infection, renal and vascular compromise, limb damage, arrhythmia, stroke, vessel dissection/perforation, Myocardial infarction, emergent CABG.  88F Cantonese Speaking with a PMH of HTN, HLD, DM Type II, PVD s/p vein procedure in 2010, chronic lower back pain, HFpEF and symptomatic critical aortic stenosis, pt is now referred for cardiac catheterization with possible PCI if clinically indicated as part of pre-op work up for TAVR.    TTE 1/6/24: normal BiV function, grade I DD, severe AS (MG 61.7, PV 4.68, ANUP 0.6)  CCTA Cardiac 1/22/24: severe calcification of aortic valve leaflets, stenosis severity is indeterminate in the mid-distal LAD, O2, RCA, remaining segments non obstructive, multiple calcified pulmonary nodules compatible with old granulomatous disease    ASA III          Mallampati III  Patient is a candidate for sedation: yes  Sedation: Moderate    - Load: As discussed with Dr. Oliveira - pt should be loaded.  mg PO x1 and Plavix 75 mg x1 (pt endorses compliance and last dose was yesterday).   - IV hydration: No NS bolus. NS 50 cc/hr x 2 hours due to severe aortic stenosis  - K 3.9 - repleted with potassium chloride 20 mEq x1  - Mg 1.7 - repleted with 1g magnesium sulfate IV x 1  - Informed consent obtained by fellow.    Risks & benefits of procedure and alternative therapy have been explained to the patient including but not limited to: allergic reaction, bleeding w/possible need for blood transfusion, infection, renal and vascular compromise, limb damage, arrhythmia, stroke, vessel dissection/perforation, Myocardial infarction, emergent CABG.  88F Cantonese Speaking with a PMH of HTN, HLD, DM Type II, PVD s/p vein procedure in 2010, chronic lower back pain, HFpEF and symptomatic critical aortic stenosis, pt is now referred for cardiac catheterization with possible BAV + PCI if clinically indicated as part of pre-op preparation for TAVR.    TTE 1/6/24: normal BiV function, grade I DD, severe AS (MG 61.7, PV 4.68, ANUP 0.6)  CCTA Cardiac 1/22/24: severe calcification of aortic valve leaflets, stenosis severity is indeterminate in the mid-distal LAD, O2, RCA, remaining segments non obstructive, multiple calcified pulmonary nodules compatible with old granulomatous disease    ASA III          Mallampati III  Patient is a candidate for sedation: yes  Sedation: Moderate    - Load: As discussed with Dr. Oliveira - pt should be loaded.  mg PO x1 and Plavix 75 mg x1 (pt endorses compliance and last dose was yesterday).   - IV hydration: No NS bolus. NS 50 cc/hr x 2 hours due to severe aortic stenosis  - K 3.9 - repleted with potassium chloride 20 mEq x1  - Mg 1.7 - repleted with 1g magnesium sulfate IV x 1  - Informed consent obtained by fellow.    Risks & benefits of procedure and alternative therapy have been explained to the patient including but not limited to: allergic reaction, bleeding w/possible need for blood transfusion, infection, renal and vascular compromise, limb damage, arrhythmia, stroke, vessel dissection/perforation, Myocardial infarction, emergent CABG.

## 2024-02-14 NOTE — H&P ADULT - NSICDXPASTMEDICALHX_GEN_ALL_CORE_FT
PAST MEDICAL HISTORY:  Aortic stenosis     Diabetes mellitus     HLD (hyperlipidemia)     HTN (hypertension)     PVD (peripheral vascular disease)

## 2024-02-15 ENCOUNTER — OUTPATIENT (OUTPATIENT)
Dept: OUTPATIENT SERVICES | Facility: HOSPITAL | Age: 89
LOS: 1 days | Discharge: HOME | End: 2024-02-15
Payer: MEDICARE

## 2024-02-15 LAB
A1C WITH ESTIMATED AVERAGE GLUCOSE RESULT: 6.6 % — HIGH (ref 4–5.6)
ALBUMIN SERPL ELPH-MCNC: 4.1 G/DL — SIGNIFICANT CHANGE UP (ref 3.3–5)
ALP SERPL-CCNC: 36 U/L — LOW (ref 40–120)
ALT FLD-CCNC: 13 U/L — SIGNIFICANT CHANGE UP (ref 10–45)
ANION GAP SERPL CALC-SCNC: 8 MMOL/L — SIGNIFICANT CHANGE UP (ref 5–17)
APTT BLD: 28.2 SEC — SIGNIFICANT CHANGE UP (ref 24.5–35.6)
AST SERPL-CCNC: 22 U/L — SIGNIFICANT CHANGE UP (ref 10–40)
BASOPHILS # BLD AUTO: 0.04 K/UL — SIGNIFICANT CHANGE UP (ref 0–0.2)
BASOPHILS NFR BLD AUTO: 0.6 % — SIGNIFICANT CHANGE UP (ref 0–2)
BILIRUB SERPL-MCNC: 0.5 MG/DL — SIGNIFICANT CHANGE UP (ref 0.2–1.2)
BUN SERPL-MCNC: 23 MG/DL — SIGNIFICANT CHANGE UP (ref 7–23)
CALCIUM SERPL-MCNC: 9.7 MG/DL — SIGNIFICANT CHANGE UP (ref 8.4–10.5)
CHLORIDE SERPL-SCNC: 108 MMOL/L — SIGNIFICANT CHANGE UP (ref 96–108)
CHOLEST SERPL-MCNC: 163 MG/DL — SIGNIFICANT CHANGE UP
CK MB CFR SERPL CALC: 2.2 NG/ML — SIGNIFICANT CHANGE UP (ref 0–6.7)
CK SERPL-CCNC: 61 U/L — SIGNIFICANT CHANGE UP (ref 25–170)
CO2 SERPL-SCNC: 27 MMOL/L — SIGNIFICANT CHANGE UP (ref 22–31)
CREAT SERPL-MCNC: 1.28 MG/DL — SIGNIFICANT CHANGE UP (ref 0.5–1.3)
EGFR: 41 ML/MIN/1.73M2 — LOW
EOSINOPHIL # BLD AUTO: 0.07 K/UL — SIGNIFICANT CHANGE UP (ref 0–0.5)
EOSINOPHIL NFR BLD AUTO: 1 % — SIGNIFICANT CHANGE UP (ref 0–6)
ESTIMATED AVERAGE GLUCOSE: 143 MG/DL — HIGH (ref 68–114)
GLUCOSE BLDC GLUCOMTR-MCNC: 115 MG/DL — HIGH (ref 70–99)
GLUCOSE BLDC GLUCOMTR-MCNC: 133 MG/DL — HIGH (ref 70–99)
GLUCOSE SERPL-MCNC: 150 MG/DL — HIGH (ref 70–99)
HCT VFR BLD CALC: 34.9 % — SIGNIFICANT CHANGE UP (ref 34.5–45)
HDLC SERPL-MCNC: 63 MG/DL — SIGNIFICANT CHANGE UP
HGB BLD-MCNC: 11.4 G/DL — LOW (ref 11.5–15.5)
IMM GRANULOCYTES NFR BLD AUTO: 0.4 % — SIGNIFICANT CHANGE UP (ref 0–0.9)
INR BLD: 0.9 — SIGNIFICANT CHANGE UP (ref 0.85–1.18)
LIPID PNL WITH DIRECT LDL SERPL: 71 MG/DL — SIGNIFICANT CHANGE UP
LYMPHOCYTES # BLD AUTO: 2.31 K/UL — SIGNIFICANT CHANGE UP (ref 1–3.3)
LYMPHOCYTES # BLD AUTO: 31.9 % — SIGNIFICANT CHANGE UP (ref 13–44)
MAGNESIUM SERPL-MCNC: 1.7 MG/DL — SIGNIFICANT CHANGE UP (ref 1.6–2.6)
MCHC RBC-ENTMCNC: 32.2 PG — SIGNIFICANT CHANGE UP (ref 27–34)
MCHC RBC-ENTMCNC: 32.7 GM/DL — SIGNIFICANT CHANGE UP (ref 32–36)
MCV RBC AUTO: 98.6 FL — SIGNIFICANT CHANGE UP (ref 80–100)
MONOCYTES # BLD AUTO: 0.67 K/UL — SIGNIFICANT CHANGE UP (ref 0–0.9)
MONOCYTES NFR BLD AUTO: 9.3 % — SIGNIFICANT CHANGE UP (ref 2–14)
NEUTROPHILS # BLD AUTO: 4.12 K/UL — SIGNIFICANT CHANGE UP (ref 1.8–7.4)
NEUTROPHILS NFR BLD AUTO: 56.8 % — SIGNIFICANT CHANGE UP (ref 43–77)
NON HDL CHOLESTEROL: 100 MG/DL — SIGNIFICANT CHANGE UP
NRBC # BLD: 0 /100 WBCS — SIGNIFICANT CHANGE UP (ref 0–0)
PLATELET # BLD AUTO: 169 K/UL — SIGNIFICANT CHANGE UP (ref 150–400)
POTASSIUM SERPL-MCNC: 3.9 MMOL/L — SIGNIFICANT CHANGE UP (ref 3.5–5.3)
POTASSIUM SERPL-SCNC: 3.9 MMOL/L — SIGNIFICANT CHANGE UP (ref 3.5–5.3)
PROT SERPL-MCNC: 7.6 G/DL — SIGNIFICANT CHANGE UP (ref 6–8.3)
PROTHROM AB SERPL-ACNC: 10.3 SEC — SIGNIFICANT CHANGE UP (ref 9.5–13)
RBC # BLD: 3.54 M/UL — LOW (ref 3.8–5.2)
RBC # FLD: 12.5 % — SIGNIFICANT CHANGE UP (ref 10.3–14.5)
SODIUM SERPL-SCNC: 143 MMOL/L — SIGNIFICANT CHANGE UP (ref 135–145)
TRIGL SERPL-MCNC: 144 MG/DL — SIGNIFICANT CHANGE UP
WBC # BLD: 7.24 K/UL — SIGNIFICANT CHANGE UP (ref 3.8–10.5)
WBC # FLD AUTO: 7.24 K/UL — SIGNIFICANT CHANGE UP (ref 3.8–10.5)

## 2024-02-15 PROCEDURE — C1760: CPT

## 2024-02-15 PROCEDURE — 93454 CORONARY ARTERY ANGIO S&I: CPT

## 2024-02-15 PROCEDURE — 85730 THROMBOPLASTIN TIME PARTIAL: CPT

## 2024-02-15 PROCEDURE — 83735 ASSAY OF MAGNESIUM: CPT

## 2024-02-15 PROCEDURE — 83036 HEMOGLOBIN GLYCOSYLATED A1C: CPT

## 2024-02-15 PROCEDURE — 82962 GLUCOSE BLOOD TEST: CPT

## 2024-02-15 PROCEDURE — 82550 ASSAY OF CK (CPK): CPT

## 2024-02-15 PROCEDURE — 93010 ELECTROCARDIOGRAM REPORT: CPT

## 2024-02-15 PROCEDURE — 82553 CREATINE MB FRACTION: CPT

## 2024-02-15 PROCEDURE — 80061 LIPID PANEL: CPT

## 2024-02-15 PROCEDURE — 99152 MOD SED SAME PHYS/QHP 5/>YRS: CPT

## 2024-02-15 PROCEDURE — C1769: CPT

## 2024-02-15 PROCEDURE — 85610 PROTHROMBIN TIME: CPT

## 2024-02-15 PROCEDURE — C1887: CPT

## 2024-02-15 PROCEDURE — 36415 COLL VENOUS BLD VENIPUNCTURE: CPT

## 2024-02-15 PROCEDURE — 80053 COMPREHEN METABOLIC PANEL: CPT

## 2024-02-15 PROCEDURE — C1894: CPT

## 2024-02-15 PROCEDURE — 93005 ELECTROCARDIOGRAM TRACING: CPT

## 2024-02-15 PROCEDURE — 85025 COMPLETE CBC W/AUTO DIFF WBC: CPT

## 2024-02-15 RX ORDER — SODIUM CHLORIDE 9 MG/ML
1000 INJECTION INTRAMUSCULAR; INTRAVENOUS; SUBCUTANEOUS
Refills: 0 | Status: DISCONTINUED | OUTPATIENT
Start: 2024-02-15 | End: 2024-02-29

## 2024-02-15 RX ORDER — POTASSIUM CHLORIDE 20 MEQ
20 PACKET (EA) ORAL ONCE
Refills: 0 | Status: COMPLETED | OUTPATIENT
Start: 2024-02-15 | End: 2024-02-15

## 2024-02-15 RX ORDER — ASPIRIN/CALCIUM CARB/MAGNESIUM 324 MG
325 TABLET ORAL ONCE
Refills: 0 | Status: COMPLETED | OUTPATIENT
Start: 2024-02-15 | End: 2024-02-15

## 2024-02-15 RX ORDER — MAGNESIUM SULFATE 500 MG/ML
1 VIAL (ML) INJECTION ONCE
Refills: 0 | Status: COMPLETED | OUTPATIENT
Start: 2024-02-15 | End: 2024-02-15

## 2024-02-15 RX ORDER — ASPIRIN/CALCIUM CARB/MAGNESIUM 324 MG
1 TABLET ORAL
Qty: 30 | Refills: 2
Start: 2024-02-15 | End: 2024-05-14

## 2024-02-15 RX ORDER — CLOPIDOGREL BISULFATE 75 MG/1
75 TABLET, FILM COATED ORAL ONCE
Refills: 0 | Status: COMPLETED | OUTPATIENT
Start: 2024-02-15 | End: 2024-02-15

## 2024-02-15 RX ADMIN — Medication 325 MILLIGRAM(S): at 09:07

## 2024-02-15 RX ADMIN — SODIUM CHLORIDE 50 MILLILITER(S): 9 INJECTION INTRAMUSCULAR; INTRAVENOUS; SUBCUTANEOUS at 08:56

## 2024-02-15 RX ADMIN — Medication 100 GRAM(S): at 09:08

## 2024-02-15 RX ADMIN — Medication 20 MILLIEQUIVALENT(S): at 09:07

## 2024-02-15 RX ADMIN — CLOPIDOGREL BISULFATE 75 MILLIGRAM(S): 75 TABLET, FILM COATED ORAL at 09:07

## 2024-02-15 RX ADMIN — SODIUM CHLORIDE 75 MILLILITER(S): 9 INJECTION INTRAMUSCULAR; INTRAVENOUS; SUBCUTANEOUS at 14:05

## 2024-02-15 NOTE — PROGRESS NOTE ADULT - SUBJECTIVE AND OBJECTIVE BOX
Interventional Cardiology PA SDA Discharge Note    Patient without complaints. Ambulated and voided without difficulties    Afebrile, VSS    Ext:    		Right Groin:     no  hematoma,    no bruit, dressing- C/D/I        Pulses:    intact RAD/DP/PT to baseline     A/P:  88F Cantonese Speaking with a PMH of HTN, HLD, DM Type II, PVD s/p vein procedure in 2010, chronic lower back pain, HFpEF and symptomatic critical aortic stenosis, pt is now referred for cardiac catheterization with possible BAV + PCI if clinically indicated as part of pre-op preparation for TAVR.    s/p 02/15/24 diagnostic LHC: Rt dominant. LM normal, mLAD 30% stenosis. mLCx 40%. pRCA 40-50%.  Access: RFA Perclose.     Plan: IVF at 75 cc/hr per Dr. Corral.  F/up Dr. Padilla outpt for TAVR. Medical treatment for moderate CAD- Stop Plavix, switch to Aspirin monotherapy.  Writer spoke with CT/Structural PA- no needed inpt testing today, cleared for discharge home.    1.	Stable for discharge as per attending Dr. Oliveira after bed rest, pt voids, groin  stable and 30 minutes of ambulation.  2.	Follow-up with PMD/Cardiologist Tee in 1-2 weeks and Dr. Padilla as scheduled.    3.	Discharged forms signed and copies in chart   4. 	Medication change of stop Plavix and start Aspirin 81mg discussed with pt and family; reflected on paperwork; Rx sent to preferred pharmacy.

## 2024-02-16 DIAGNOSIS — I25.10 ATHEROSCLEROTIC HEART DISEASE OF NATIVE CORONARY ARTERY WITHOUT ANGINA PECTORIS: ICD-10-CM

## 2024-02-16 DIAGNOSIS — R94.39 ABNORMAL RESULT OF OTHER CARDIOVASCULAR FUNCTION STUDY: ICD-10-CM

## 2024-02-20 PROBLEM — I35.0 NONRHEUMATIC AORTIC (VALVE) STENOSIS: Chronic | Status: ACTIVE | Noted: 2024-02-14

## 2024-02-20 PROBLEM — I10 ESSENTIAL (PRIMARY) HYPERTENSION: Chronic | Status: ACTIVE | Noted: 2024-02-14

## 2024-02-20 PROBLEM — E78.5 HYPERLIPIDEMIA, UNSPECIFIED: Chronic | Status: ACTIVE | Noted: 2024-02-14

## 2024-02-20 PROBLEM — E11.9 TYPE 2 DIABETES MELLITUS WITHOUT COMPLICATIONS: Chronic | Status: ACTIVE | Noted: 2024-02-14

## 2024-03-01 ENCOUNTER — NON-APPOINTMENT (OUTPATIENT)
Age: 89
End: 2024-03-01

## 2024-03-05 ENCOUNTER — APPOINTMENT (OUTPATIENT)
Dept: CARDIOTHORACIC SURGERY | Facility: HOSPITAL | Age: 89
End: 2024-03-05

## 2024-03-20 ENCOUNTER — NON-APPOINTMENT (OUTPATIENT)
Age: 89
End: 2024-03-20

## 2024-03-20 ENCOUNTER — TRANSCRIPTION ENCOUNTER (OUTPATIENT)
Age: 89
End: 2024-03-20

## 2024-03-20 VITALS
HEART RATE: 90 BPM | WEIGHT: 119.05 LBS | SYSTOLIC BLOOD PRESSURE: 130 MMHG | HEIGHT: 59 IN | DIASTOLIC BLOOD PRESSURE: 76 MMHG | RESPIRATION RATE: 16 BRPM

## 2024-03-20 NOTE — PATIENT PROFILE ADULT - FUNCTIONAL SCREEN CURRENT LEVEL: COMMUNICATION, MLM
0 = understands/communicates without difficulty hard of hearing/0 = understands/communicates without difficulty

## 2024-03-20 NOTE — PATIENT PROFILE ADULT - FALL HARM RISK - HARM RISK INTERVENTIONS

## 2024-03-21 ENCOUNTER — APPOINTMENT (OUTPATIENT)
Dept: CARDIOTHORACIC SURGERY | Facility: HOSPITAL | Age: 89
End: 2024-03-21

## 2024-03-21 ENCOUNTER — RESULT REVIEW (OUTPATIENT)
Age: 89
End: 2024-03-21

## 2024-03-21 ENCOUNTER — INPATIENT (INPATIENT)
Facility: HOSPITAL | Age: 89
LOS: 0 days | Discharge: HOME HEALTH | DRG: 267 | End: 2024-03-22
Attending: INTERNAL MEDICINE | Admitting: INTERNAL MEDICINE
Payer: MEDICARE

## 2024-03-21 LAB
ALBUMIN SERPL ELPH-MCNC: 4.5 G/DL — SIGNIFICANT CHANGE UP (ref 3.3–5)
ALP SERPL-CCNC: 36 U/L — LOW (ref 40–120)
ALT FLD-CCNC: 11 U/L — SIGNIFICANT CHANGE UP (ref 10–45)
ANION GAP SERPL CALC-SCNC: 11 MMOL/L — SIGNIFICANT CHANGE UP (ref 5–17)
ANION GAP SERPL CALC-SCNC: 8 MMOL/L — SIGNIFICANT CHANGE UP (ref 5–17)
APTT BLD: 30.5 SEC — SIGNIFICANT CHANGE UP (ref 24.5–35.6)
APTT BLD: 31.3 SEC — SIGNIFICANT CHANGE UP (ref 24.5–35.6)
AST SERPL-CCNC: 25 U/L — SIGNIFICANT CHANGE UP (ref 10–40)
BASE EXCESS BLDA CALC-SCNC: 0.7 MMOL/L — SIGNIFICANT CHANGE UP (ref -2–3)
BASE EXCESS BLDA CALC-SCNC: 1.8 MMOL/L — SIGNIFICANT CHANGE UP (ref -2–3)
BASE EXCESS BLDA CALC-SCNC: 2.6 MMOL/L — SIGNIFICANT CHANGE UP (ref -2–3)
BASOPHILS # BLD AUTO: 0.02 K/UL — SIGNIFICANT CHANGE UP (ref 0–0.2)
BASOPHILS NFR BLD AUTO: 0.2 % — SIGNIFICANT CHANGE UP (ref 0–2)
BILIRUB SERPL-MCNC: 0.5 MG/DL — SIGNIFICANT CHANGE UP (ref 0.2–1.2)
BUN SERPL-MCNC: 17 MG/DL — SIGNIFICANT CHANGE UP (ref 7–23)
BUN SERPL-MCNC: 20 MG/DL — SIGNIFICANT CHANGE UP (ref 7–23)
CA-I BLDA-SCNC: 1.21 MMOL/L — SIGNIFICANT CHANGE UP (ref 1.15–1.33)
CA-I BLDA-SCNC: 1.22 MMOL/L — SIGNIFICANT CHANGE UP (ref 1.15–1.33)
CA-I BLDA-SCNC: 1.29 MMOL/L — SIGNIFICANT CHANGE UP (ref 1.15–1.33)
CALCIUM SERPL-MCNC: 9.8 MG/DL — SIGNIFICANT CHANGE UP (ref 8.4–10.5)
CALCIUM SERPL-MCNC: 9.9 MG/DL — SIGNIFICANT CHANGE UP (ref 8.4–10.5)
CHLORIDE SERPL-SCNC: 104 MMOL/L — SIGNIFICANT CHANGE UP (ref 96–108)
CHLORIDE SERPL-SCNC: 105 MMOL/L — SIGNIFICANT CHANGE UP (ref 96–108)
CO2 BLDA-SCNC: 28 MMOL/L — HIGH (ref 19–24)
CO2 BLDA-SCNC: 29 MMOL/L — HIGH (ref 19–24)
CO2 BLDA-SCNC: 29 MMOL/L — HIGH (ref 19–24)
CO2 SERPL-SCNC: 27 MMOL/L — SIGNIFICANT CHANGE UP (ref 22–31)
CO2 SERPL-SCNC: 27 MMOL/L — SIGNIFICANT CHANGE UP (ref 22–31)
COHGB MFR BLDA: 0.8 % — SIGNIFICANT CHANGE UP
COHGB MFR BLDA: 0.9 % — SIGNIFICANT CHANGE UP
COHGB MFR BLDA: 1 % — SIGNIFICANT CHANGE UP
CREAT SERPL-MCNC: 1.02 MG/DL — SIGNIFICANT CHANGE UP (ref 0.5–1.3)
CREAT SERPL-MCNC: 1.22 MG/DL — SIGNIFICANT CHANGE UP (ref 0.5–1.3)
EGFR: 43 ML/MIN/1.73M2 — LOW
EGFR: 53 ML/MIN/1.73M2 — LOW
EOSINOPHIL # BLD AUTO: 0.13 K/UL — SIGNIFICANT CHANGE UP (ref 0–0.5)
EOSINOPHIL NFR BLD AUTO: 1.5 % — SIGNIFICANT CHANGE UP (ref 0–6)
GAS PNL BLDA: SIGNIFICANT CHANGE UP
GLUCOSE BLDA-MCNC: 110 MG/DL — HIGH (ref 70–99)
GLUCOSE BLDA-MCNC: 118 MG/DL — HIGH (ref 70–99)
GLUCOSE BLDA-MCNC: 124 MG/DL — HIGH (ref 70–99)
GLUCOSE BLDC GLUCOMTR-MCNC: 117 MG/DL — HIGH (ref 70–99)
GLUCOSE BLDC GLUCOMTR-MCNC: 152 MG/DL — HIGH (ref 70–99)
GLUCOSE BLDC GLUCOMTR-MCNC: 155 MG/DL — HIGH (ref 70–99)
GLUCOSE BLDC GLUCOMTR-MCNC: 187 MG/DL — HIGH (ref 70–99)
GLUCOSE SERPL-MCNC: 118 MG/DL — HIGH (ref 70–99)
GLUCOSE SERPL-MCNC: 145 MG/DL — HIGH (ref 70–99)
HCO3 BLDA-SCNC: 27 MMOL/L — SIGNIFICANT CHANGE UP (ref 21–28)
HCO3 BLDA-SCNC: 27 MMOL/L — SIGNIFICANT CHANGE UP (ref 21–28)
HCO3 BLDA-SCNC: 28 MMOL/L — SIGNIFICANT CHANGE UP (ref 21–28)
HCT VFR BLD CALC: 29.3 % — LOW (ref 34.5–45)
HCT VFR BLD CALC: 32.6 % — LOW (ref 34.5–45)
HGB BLD-MCNC: 10.8 G/DL — LOW (ref 11.5–15.5)
HGB BLD-MCNC: 9.7 G/DL — LOW (ref 11.5–15.5)
HGB BLDA-MCNC: 10.3 G/DL — LOW (ref 11.7–16.1)
HGB BLDA-MCNC: 9.4 G/DL — LOW (ref 11.7–16.1)
HGB BLDA-MCNC: 9.6 G/DL — LOW (ref 11.7–16.1)
IMM GRANULOCYTES NFR BLD AUTO: 0.8 % — SIGNIFICANT CHANGE UP (ref 0–0.9)
INR BLD: 0.96 — SIGNIFICANT CHANGE UP (ref 0.85–1.18)
INR BLD: 1.05 — SIGNIFICANT CHANGE UP (ref 0.85–1.18)
LYMPHOCYTES # BLD AUTO: 2.36 K/UL — SIGNIFICANT CHANGE UP (ref 1–3.3)
LYMPHOCYTES # BLD AUTO: 27.9 % — SIGNIFICANT CHANGE UP (ref 13–44)
MAGNESIUM SERPL-MCNC: 1.5 MG/DL — LOW (ref 1.6–2.6)
MCHC RBC-ENTMCNC: 32 PG — SIGNIFICANT CHANGE UP (ref 27–34)
MCHC RBC-ENTMCNC: 32.4 PG — SIGNIFICANT CHANGE UP (ref 27–34)
MCHC RBC-ENTMCNC: 33.1 GM/DL — SIGNIFICANT CHANGE UP (ref 32–36)
MCHC RBC-ENTMCNC: 33.1 GM/DL — SIGNIFICANT CHANGE UP (ref 32–36)
MCV RBC AUTO: 96.7 FL — SIGNIFICANT CHANGE UP (ref 80–100)
MCV RBC AUTO: 98 FL — SIGNIFICANT CHANGE UP (ref 80–100)
METHGB MFR BLDA: 1.3 % — SIGNIFICANT CHANGE UP
METHGB MFR BLDA: 1.4 % — SIGNIFICANT CHANGE UP
METHGB MFR BLDA: 1.4 % — SIGNIFICANT CHANGE UP
MONOCYTES # BLD AUTO: 0.62 K/UL — SIGNIFICANT CHANGE UP (ref 0–0.9)
MONOCYTES NFR BLD AUTO: 7.3 % — SIGNIFICANT CHANGE UP (ref 2–14)
NEUTROPHILS # BLD AUTO: 5.27 K/UL — SIGNIFICANT CHANGE UP (ref 1.8–7.4)
NEUTROPHILS NFR BLD AUTO: 62.3 % — SIGNIFICANT CHANGE UP (ref 43–77)
NRBC # BLD: 0 /100 WBCS — SIGNIFICANT CHANGE UP (ref 0–0)
NRBC # BLD: 0 /100 WBCS — SIGNIFICANT CHANGE UP (ref 0–0)
NT-PROBNP SERPL-SCNC: 612 PG/ML — HIGH (ref 0–300)
OXYHGB MFR BLDA: 96.2 % — HIGH (ref 90–95)
OXYHGB MFR BLDA: 96.8 % — HIGH (ref 90–95)
OXYHGB MFR BLDA: 97.7 % — HIGH (ref 90–95)
PCO2 BLDA: 41 MMHG — SIGNIFICANT CHANGE UP (ref 32–45)
PCO2 BLDA: 49 MMHG — HIGH (ref 32–45)
PCO2 BLDA: 53 MMHG — HIGH (ref 32–45)
PH BLDA: 7.32 — LOW (ref 7.35–7.45)
PH BLDA: 7.36 — SIGNIFICANT CHANGE UP (ref 7.35–7.45)
PH BLDA: 7.43 — SIGNIFICANT CHANGE UP (ref 7.35–7.45)
PHOSPHATE SERPL-MCNC: 3.4 MG/DL — SIGNIFICANT CHANGE UP (ref 2.5–4.5)
PLATELET # BLD AUTO: 125 K/UL — LOW (ref 150–400)
PLATELET # BLD AUTO: 149 K/UL — LOW (ref 150–400)
PO2 BLDA: 192 MMHG — HIGH (ref 83–108)
PO2 BLDA: 90 MMHG — SIGNIFICANT CHANGE UP (ref 83–108)
PO2 BLDA: 97 MMHG — SIGNIFICANT CHANGE UP (ref 83–108)
POTASSIUM BLDA-SCNC: 3.7 MMOL/L — SIGNIFICANT CHANGE UP (ref 3.5–5.1)
POTASSIUM BLDA-SCNC: 3.8 MMOL/L — SIGNIFICANT CHANGE UP (ref 3.5–5.1)
POTASSIUM BLDA-SCNC: 3.9 MMOL/L — SIGNIFICANT CHANGE UP (ref 3.5–5.1)
POTASSIUM SERPL-MCNC: 3.8 MMOL/L — SIGNIFICANT CHANGE UP (ref 3.5–5.3)
POTASSIUM SERPL-MCNC: 4.2 MMOL/L — SIGNIFICANT CHANGE UP (ref 3.5–5.3)
POTASSIUM SERPL-SCNC: 3.8 MMOL/L — SIGNIFICANT CHANGE UP (ref 3.5–5.3)
POTASSIUM SERPL-SCNC: 4.2 MMOL/L — SIGNIFICANT CHANGE UP (ref 3.5–5.3)
PROT SERPL-MCNC: 7.6 G/DL — SIGNIFICANT CHANGE UP (ref 6–8.3)
PROTHROM AB SERPL-ACNC: 11 SEC — SIGNIFICANT CHANGE UP (ref 9.5–13)
PROTHROM AB SERPL-ACNC: 12 SEC — SIGNIFICANT CHANGE UP (ref 9.5–13)
RBC # BLD: 2.99 M/UL — LOW (ref 3.8–5.2)
RBC # BLD: 3.37 M/UL — LOW (ref 3.8–5.2)
RBC # FLD: 12.7 % — SIGNIFICANT CHANGE UP (ref 10.3–14.5)
RBC # FLD: 12.9 % — SIGNIFICANT CHANGE UP (ref 10.3–14.5)
SAO2 % BLDA: 98.6 % — HIGH (ref 94–98)
SAO2 % BLDA: 99.1 % — HIGH (ref 94–98)
SAO2 % BLDA: 99.7 % — HIGH (ref 94–98)
SODIUM BLDA-SCNC: 140 MMOL/L — SIGNIFICANT CHANGE UP (ref 136–145)
SODIUM SERPL-SCNC: 140 MMOL/L — SIGNIFICANT CHANGE UP (ref 135–145)
SODIUM SERPL-SCNC: 142 MMOL/L — SIGNIFICANT CHANGE UP (ref 135–145)
WBC # BLD: 6.98 K/UL — SIGNIFICANT CHANGE UP (ref 3.8–10.5)
WBC # BLD: 8.47 K/UL — SIGNIFICANT CHANGE UP (ref 3.8–10.5)
WBC # FLD AUTO: 6.98 K/UL — SIGNIFICANT CHANGE UP (ref 3.8–10.5)
WBC # FLD AUTO: 8.47 K/UL — SIGNIFICANT CHANGE UP (ref 3.8–10.5)

## 2024-03-21 PROCEDURE — 93308 TTE F-UP OR LMTD: CPT | Mod: 26,59

## 2024-03-21 PROCEDURE — 33361 REPLACE AORTIC VALVE PERQ: CPT | Mod: 62,Q0

## 2024-03-21 PROCEDURE — 93306 TTE W/DOPPLER COMPLETE: CPT | Mod: 26

## 2024-03-21 PROCEDURE — 93010 ELECTROCARDIOGRAM REPORT: CPT

## 2024-03-21 PROCEDURE — 93452 LEFT HRT CATH W/VENTRCLGRPHY: CPT | Mod: 26,80,XU

## 2024-03-21 PROCEDURE — 71045 X-RAY EXAM CHEST 1 VIEW: CPT | Mod: 26

## 2024-03-21 DEVICE — WIRE GD CONFIDA BRECKER CRV .035X260: Type: IMPLANTABLE DEVICE | Status: FUNCTIONAL

## 2024-03-21 DEVICE — PACING CATH PACEL RIGHT HEART CURVE 5FR: Type: IMPLANTABLE DEVICE | Status: FUNCTIONAL

## 2024-03-21 DEVICE — SHEATH INTRODUCER TERUMO PINNACLE CORONARY 5FR X 10CM X 0.038" MINI WIRE: Type: IMPLANTABLE DEVICE | Status: FUNCTIONAL

## 2024-03-21 DEVICE — GUIDEWIRE TERUMO GLIDEWIRE ANGLED .035" X 150CM STANDARD: Type: IMPLANTABLE DEVICE | Status: FUNCTIONAL

## 2024-03-21 DEVICE — EMERALD GUIDEWIRE 0.35: Type: IMPLANTABLE DEVICE | Status: FUNCTIONAL

## 2024-03-21 DEVICE — SHEATH INTRODUCER TERUMO PINNACLE CORONARY 8FR X 10CM X 0.038" MINI WIRE: Type: IMPLANTABLE DEVICE | Status: FUNCTIONAL

## 2024-03-21 DEVICE — GUIDEWIRE STANDARD STRAIGHT .035" X 180CM: Type: IMPLANTABLE DEVICE | Status: FUNCTIONAL

## 2024-03-21 DEVICE — INTRO MICROPUNC 4FRX10CM SS: Type: IMPLANTABLE DEVICE | Status: FUNCTIONAL

## 2024-03-21 DEVICE — INTRODUCER SHEATH KIT GLIDESHEATH SLENDER FLEX STRAIGHT 21G 6F X 10CM: Type: IMPLANTABLE DEVICE | Status: FUNCTIONAL

## 2024-03-21 DEVICE — CATH DX JL3.5 INFIN 5FR X 100CM: Type: IMPLANTABLE DEVICE | Status: FUNCTIONAL

## 2024-03-21 DEVICE — BLLN Z-MED II 20MMX4X100CM: Type: IMPLANTABLE DEVICE | Status: FUNCTIONAL

## 2024-03-21 DEVICE — GWIRE GUID  0.035INX150CM: Type: IMPLANTABLE DEVICE | Status: FUNCTIONAL

## 2024-03-21 DEVICE — SHEATH INTRODUCER TERUMO PINNACLE CORONARY 6FR X 25CM: Type: IMPLANTABLE DEVICE | Status: FUNCTIONAL

## 2024-03-21 DEVICE — CATH DX AL1 INFIN 5FRX100CM: Type: IMPLANTABLE DEVICE | Status: FUNCTIONAL

## 2024-03-21 DEVICE — VLV TRANS CATH W/COMM SYS SAPIEN 3 ULTRA 23MM: Type: IMPLANTABLE DEVICE | Status: FUNCTIONAL

## 2024-03-21 DEVICE — GWIRE JTIP 1.5MM .035X180CM: Type: IMPLANTABLE DEVICE | Status: FUNCTIONAL

## 2024-03-21 DEVICE — SUT PERCLOSE PROGLIDE 6FR: Type: IMPLANTABLE DEVICE | Status: FUNCTIONAL

## 2024-03-21 DEVICE — CATH DX JL4 INFIN 5FRX100CM: Type: IMPLANTABLE DEVICE | Status: FUNCTIONAL

## 2024-03-21 DEVICE — CATH DX JR4 INFIN 5FRX100CM: Type: IMPLANTABLE DEVICE | Status: FUNCTIONAL

## 2024-03-21 DEVICE — CATH DX PIG 145 INFIN 5FRX110CM: Type: IMPLANTABLE DEVICE | Status: FUNCTIONAL

## 2024-03-21 RX ORDER — SENNA PLUS 8.6 MG/1
1 TABLET ORAL
Refills: 0 | DISCHARGE

## 2024-03-21 RX ORDER — DEXTROSE 50 % IN WATER 50 %
12.5 SYRINGE (ML) INTRAVENOUS ONCE
Refills: 0 | Status: DISCONTINUED | OUTPATIENT
Start: 2024-03-21 | End: 2024-03-22

## 2024-03-21 RX ORDER — SODIUM CHLORIDE 9 MG/ML
1000 INJECTION, SOLUTION INTRAVENOUS
Refills: 0 | Status: DISCONTINUED | OUTPATIENT
Start: 2024-03-21 | End: 2024-03-22

## 2024-03-21 RX ORDER — HYDRALAZINE HCL 50 MG
10 TABLET ORAL ONCE
Refills: 0 | Status: COMPLETED | OUTPATIENT
Start: 2024-03-21 | End: 2024-03-21

## 2024-03-21 RX ORDER — ATORVASTATIN CALCIUM 80 MG/1
1 TABLET, FILM COATED ORAL
Refills: 0 | DISCHARGE

## 2024-03-21 RX ORDER — SENNA PLUS 8.6 MG/1
2 TABLET ORAL AT BEDTIME
Refills: 0 | Status: DISCONTINUED | OUTPATIENT
Start: 2024-03-21 | End: 2024-03-22

## 2024-03-21 RX ORDER — INSULIN LISPRO 100/ML
VIAL (ML) SUBCUTANEOUS
Refills: 0 | Status: DISCONTINUED | OUTPATIENT
Start: 2024-03-21 | End: 2024-03-22

## 2024-03-21 RX ORDER — POTASSIUM CHLORIDE 20 MEQ
20 PACKET (EA) ORAL ONCE
Refills: 0 | Status: COMPLETED | OUTPATIENT
Start: 2024-03-21 | End: 2024-03-21

## 2024-03-21 RX ORDER — HYDRALAZINE HCL 50 MG
5 TABLET ORAL ONCE
Refills: 0 | Status: COMPLETED | OUTPATIENT
Start: 2024-03-21 | End: 2024-03-21

## 2024-03-21 RX ORDER — DEXTROSE 50 % IN WATER 50 %
15 SYRINGE (ML) INTRAVENOUS ONCE
Refills: 0 | Status: DISCONTINUED | OUTPATIENT
Start: 2024-03-21 | End: 2024-03-22

## 2024-03-21 RX ORDER — ASPIRIN/CALCIUM CARB/MAGNESIUM 324 MG
81 TABLET ORAL DAILY
Refills: 0 | Status: DISCONTINUED | OUTPATIENT
Start: 2024-03-22 | End: 2024-03-22

## 2024-03-21 RX ORDER — CEFAZOLIN SODIUM 1 G
2000 VIAL (EA) INJECTION EVERY 8 HOURS
Refills: 0 | Status: COMPLETED | OUTPATIENT
Start: 2024-03-21 | End: 2024-03-21

## 2024-03-21 RX ORDER — DEXTROSE 50 % IN WATER 50 %
25 SYRINGE (ML) INTRAVENOUS ONCE
Refills: 0 | Status: DISCONTINUED | OUTPATIENT
Start: 2024-03-21 | End: 2024-03-22

## 2024-03-21 RX ORDER — GLUCAGON INJECTION, SOLUTION 0.5 MG/.1ML
1 INJECTION, SOLUTION SUBCUTANEOUS ONCE
Refills: 0 | Status: DISCONTINUED | OUTPATIENT
Start: 2024-03-21 | End: 2024-03-22

## 2024-03-21 RX ORDER — MAGNESIUM SULFATE 500 MG/ML
2 VIAL (ML) INJECTION ONCE
Refills: 0 | Status: COMPLETED | OUTPATIENT
Start: 2024-03-21 | End: 2024-03-21

## 2024-03-21 RX ORDER — ACARBOSE 25 MG/1
1 TABLET ORAL
Refills: 0 | DISCHARGE

## 2024-03-21 RX ORDER — CLOPIDOGREL BISULFATE 75 MG/1
1 TABLET, FILM COATED ORAL
Refills: 0 | DISCHARGE

## 2024-03-21 RX ORDER — HEPARIN SODIUM 5000 [USP'U]/ML
5000 INJECTION INTRAVENOUS; SUBCUTANEOUS EVERY 8 HOURS
Refills: 0 | Status: DISCONTINUED | OUTPATIENT
Start: 2024-03-21 | End: 2024-03-22

## 2024-03-21 RX ORDER — LINAGLIPTIN 5 MG/1
1 TABLET, FILM COATED ORAL
Refills: 0 | DISCHARGE

## 2024-03-21 RX ORDER — FOLIC ACID 0.8 MG
1 TABLET ORAL
Refills: 0 | DISCHARGE

## 2024-03-21 RX ORDER — ACETAMINOPHEN 500 MG
650 TABLET ORAL EVERY 6 HOURS
Refills: 0 | Status: DISCONTINUED | OUTPATIENT
Start: 2024-03-21 | End: 2024-03-22

## 2024-03-21 RX ORDER — ONDANSETRON 8 MG/1
4 TABLET, FILM COATED ORAL ONCE
Refills: 0 | Status: COMPLETED | OUTPATIENT
Start: 2024-03-21 | End: 2024-03-21

## 2024-03-21 RX ORDER — DOCUSATE SODIUM 100 MG
1 CAPSULE ORAL
Refills: 0 | DISCHARGE

## 2024-03-21 RX ORDER — ATORVASTATIN CALCIUM 80 MG/1
40 TABLET, FILM COATED ORAL AT BEDTIME
Refills: 0 | Status: DISCONTINUED | OUTPATIENT
Start: 2024-03-21 | End: 2024-03-22

## 2024-03-21 RX ORDER — PANTOPRAZOLE SODIUM 20 MG/1
40 TABLET, DELAYED RELEASE ORAL
Refills: 0 | Status: DISCONTINUED | OUTPATIENT
Start: 2024-03-21 | End: 2024-03-22

## 2024-03-21 RX ORDER — FAMOTIDINE 10 MG/ML
1 INJECTION INTRAVENOUS
Refills: 0 | DISCHARGE

## 2024-03-21 RX ADMIN — Medication 100 MILLIGRAM(S): at 14:12

## 2024-03-21 RX ADMIN — Medication 2: at 16:43

## 2024-03-21 RX ADMIN — HEPARIN SODIUM 5000 UNIT(S): 5000 INJECTION INTRAVENOUS; SUBCUTANEOUS at 14:12

## 2024-03-21 RX ADMIN — Medication 20 MILLIEQUIVALENT(S): at 14:57

## 2024-03-21 RX ADMIN — ONDANSETRON 4 MILLIGRAM(S): 8 TABLET, FILM COATED ORAL at 14:57

## 2024-03-21 RX ADMIN — Medication 2: at 11:06

## 2024-03-21 RX ADMIN — Medication 2: at 22:23

## 2024-03-21 RX ADMIN — Medication 10 MILLIGRAM(S): at 10:33

## 2024-03-21 RX ADMIN — HEPARIN SODIUM 5000 UNIT(S): 5000 INJECTION INTRAVENOUS; SUBCUTANEOUS at 21:35

## 2024-03-21 RX ADMIN — ATORVASTATIN CALCIUM 40 MILLIGRAM(S): 80 TABLET, FILM COATED ORAL at 21:36

## 2024-03-21 RX ADMIN — Medication 5 MILLIGRAM(S): at 15:44

## 2024-03-21 RX ADMIN — Medication 100 MILLIGRAM(S): at 22:24

## 2024-03-21 RX ADMIN — Medication 25 GRAM(S): at 11:16

## 2024-03-21 NOTE — H&P ADULT - HISTORY OF PRESENT ILLNESS
89 y/o, Cantonese-speaking, F with a PMHx of anxiety, HTN, HLD, T2DM, PVD s/p vein procedure (in [place], 2010), chronic low back pain, HFpEF and symptomatic critical aortic stenosis (TTE 1/6/24: LVEF 55-60%, MG 61.7, PV 4.68, ANUP 0.6). Pt was at her baseline state of health until 12/31/23 when she started experiencing chest pain with minimal exertion. was referred to a cardiologist. TTE on 1/6/24 showed normal BiV function, grade I diastolic dysfunction, severe AS (MG 61, PV 4.68). Referred for TAVR evaluation and deemed a good candidate. Presents to [facility] on 3/21/24 for planned intervention. On arrival pt Denies any chest pain, palpitations, orthopnea, dyspnea on exertion, shortness of breath, wheezing, abd pain, nausea, vomiting, constipation, lightheadedness, headaches, fevers, or chills.

## 2024-03-21 NOTE — H&P ADULT - ASSESSMENT
87 y/o, Cantonese-speaking, F with a PMHx of anxiety, HTN, HLD, T2DM, PVD s/p vein procedure (in [place], 2010), chronic low back pain, HFpEF and symptomatic critical aortic stenosis (TTE 1/6/24: LVEF 55-60%, MG 61.7, PV 4.68, ANPU 0.6). Pt was at her baseline state of health until 12/31/23 when she started experiencing chest pain with minimal exertion. was referred to a cardiologist. TTE on 1/6/24 showed normal BiV function, grade I diastolic dysfunction, severe AS (MG 61, PV 4.68). Referred for TAVR evaluation and deemed a good candidate. Presents to [facility] on 3/21/24 for planned intervention.    Plan:  -needs postop ECHO, EKG, labs, CXR   -will need MCOT on discharge  -restart home meds as clinically appropriate   -avoid ramon blocking agents immediately postoperatively  -flat 4 hours     Dispo: 9L ICU care

## 2024-03-21 NOTE — PRE-ANESTHESIA EVALUATION ADULT - NSANTHOSAYNRD_GEN_A_CORE
No. ONI screening performed.  STOP BANG Legend: 0-2 = LOW Risk; 3-4 = INTERMEDIATE Risk; 5-8 = HIGH Risk

## 2024-03-21 NOTE — CHART NOTE - NSCHARTNOTEFT_GEN_A_CORE
Attending: Dr. Padilla  Procedure: TF TAVr (23 mm Lise)      Access:   - R CFA: 14 F, perclosed x2  - L CFA: 5F, perclosed x1  - L CFV: 6F, manual   TVP: None  Pre-existing rhythm issues: None  QRS: 80  Intra-op rhythm issues: brief widening with QRS to 111  Post-op rhythm issues: None QRS: 84  TR Band: None       General: Sitting in bed comfortably in NAD  Neuro: A&Ox3, no focal deficits   HEENT: NCAT, EOMI   Cardiac: Regular rate and rhythm, normal S1 and S2. No m/r/g   Pulm: Breathing comfortably on RA. No signs of respiratory distress. Lungs are CTA b/l without wheezes, rales, or rhonchi   Abdomen: Soft, non-distended, non-tender. + bowel sounds   : No martin  Extremities: Warm and well perfused, no peripheral edema, distal pulses 2+. No calf tenderness. SCDs and TEDs in place  MSK: Full AROM   Wound: Groins without hematoma, gauze dry          Bed rest: 4 hours (up @ 1400)  Anti-platelet: ASA only    TTE/EKG ordered:     Dispo:

## 2024-03-21 NOTE — PRE-ANESTHESIA EVALUATION ADULT - NSANTHPMHFT_GEN_ALL_CORE
88F Cantonese Speaking with a PMH of HTN, HLD, DM Type II, PVD s/p vein procedure in 2010, chronic lower back pain, HFpEF and symptomatic critical aortic stenosis who presented to outpatient cardiologist Dr. Oliveira after she began experiencing chest pain and MCKEON with 1 block, relieved w/ rest. Also endorses dizziness and brief palpitations. Work up revealed severe AS (report below) and is planned for TAVR with Dr. James.  During pre-op evaluation, CCTA showed severe coronary calcification but can not rule out severe CAD.  She is here for LHC/possible BAV+PCI. Denies diaphoresis, orthopnea/PND, abdominal pain, N/V/D, urinary sx, BRBPR, hematuria, melena, LE swelling.    TTE 1/6/24: normal BiV function, grade I DD, severe AS (MG 61.7, PV 4.68, ANUP 0.6)  CCTA Cardiac 1/22/24: severe calcification of aortic valve leaflets, stenosis severity is indeterminate in the mid-distal LAD, O2, RCA, remaining segments non obstructive, multiple calcified pulmonary nodules compatible with old granulomatous disease

## 2024-03-21 NOTE — BRIEF OPERATIVE NOTE - CONDITION POST OP
DOS 02/23/2018. Pre procedural interview complete on 02/20/2018 . Patient verbalizes correct arrival time 0530 and place [id number], NPO status at midnight and medications as directed by cardiologist with only sips of water as per anesthesia protocol on DOS. Patient instructed to shower with antibacterial soap per protocol. Patient lives with wife. After hospital discharge patient will be going home with Aida, wife. Patient acknowledged understanding to the plan of care and had no further questions/concerns.       stable

## 2024-03-21 NOTE — H&P ADULT - NSICDXPASTMEDICALHX_GEN_ALL_CORE_FT
PAST MEDICAL HISTORY:  Aortic stenosis     Chronic diastolic congestive heart failure     Diabetes mellitus     HLD (hyperlipidemia)     HTN (hypertension)     PVD (peripheral vascular disease) s/p vein procedure (2010)

## 2024-03-21 NOTE — BRIEF OPERATIVE NOTE - SPECIMENS
Clinic Visit Note:     Pt seen in clinic today: Yes  Pt brought back up equipment: Yes    Primary Controller SN: [id number]; 13 months  Primary Controller self test complete: Yes    Secondary Controller SN: [id number]; 22 months  Secondary Controller self test complete: Yes  Secondary Controller back up battery charged: Yes    Controller Settings: 6000/5800; 50%       date within 3 years from today's date on all batteries brought to clinic: Yes    All equipment brought to clinic inspected for damage: Yes     no

## 2024-03-21 NOTE — H&P ADULT - NSHPPHYSICALEXAM_GEN_ALL_CORE
PHYSICAL EXAM:  General: sitting in chair in NAD   Neurological: AOx3. Motor skills grossly intact  Cardiovascular: Normal S1/S2.   Respiratory: no accessory muscule useage   Gastrointestinal: Soft. Non-tender  Extremities: . No edema. No calf tenderness.  Vascular: Radial and DP pulses WNL   Incision Sites: none

## 2024-03-21 NOTE — BRIEF OPERATIVE NOTE - OPERATION/FINDINGS
predilation with 20 Zmed  R CFA: 14 F, perclosed x2  L CFA: 5F  L CFV: 6F, manual  predilation with 20 Zmed  R CFA: 14 F, perclosed x2  L CFA: 5F, perclosed x1  L CFV: 6F, manual

## 2024-03-21 NOTE — H&P ADULT - NSHPREVIEWOFSYSTEMS_GEN_ALL_CORE
Review of Systems  CONSTITUTIONAL:  Denies Fevers / chills, sweats, fatigue, weight loss, weight gain                                      NEURO:  Denies changes in sensation, seizures, syncope, confusion                                                                            EYES:  Denies Blurry vision, discharge, pain, loss of vision                                                                                    ENMT:  Denies Difficulty hearing, vertigo, dysphagia, epistaxis, recent dental work                                       CV:  Denies Chest pain, palpitations, MCKEON, orthopnea                                                                                          RESPIRATORY:  Denies Wheezing, SOB, cough / sputum, hemoptysis                                                                GI:  Denies Nausea, vomiting, diarrhea, constipation, melena, difficulty swallowing                                               : Denies Hematuria, dysuria, urgency, incontinence                                                                                         MUSCULOSKELETAL:  Denies arthritis, joint swelling, muscle weakness                                                             SKIN/BREAST:  Denies rash, itching, hair loss, masses                                                                                            PSYCH:  Denies depression, anxiety, suicidal ideation                                                                                               HEME/LYMPH:  Denies bruises easily, enlarged lymph nodes, tender lymph nodes                                        ENDOCRINE:  Denies cold intolerance, heat intolerance, polydipsia

## 2024-03-21 NOTE — PRE-ANESTHESIA EVALUATION ADULT - NSANTHPEFT_GEN_ALL_CORE
General: Appearance is consistent with chronological age. No abnormal facies.  Airway:  See Mallampati score  EENT: Anicteric sclera; oropharynx clear, moist mucus membranes  Cardiovascular:  Regular rate and rhythm  Respiratory: Unlabored breathing  Neurological: Awake and alert, moves all extremities

## 2024-03-22 ENCOUNTER — TRANSCRIPTION ENCOUNTER (OUTPATIENT)
Age: 89
End: 2024-03-22

## 2024-03-22 VITALS
RESPIRATION RATE: 18 BRPM | HEART RATE: 58 BPM | DIASTOLIC BLOOD PRESSURE: 54 MMHG | OXYGEN SATURATION: 97 % | SYSTOLIC BLOOD PRESSURE: 99 MMHG

## 2024-03-22 LAB
ANION GAP SERPL CALC-SCNC: 10 MMOL/L — SIGNIFICANT CHANGE UP (ref 5–17)
APTT BLD: 32.3 SEC — SIGNIFICANT CHANGE UP (ref 24.5–35.6)
BASOPHILS # BLD AUTO: 0.03 K/UL — SIGNIFICANT CHANGE UP (ref 0–0.2)
BASOPHILS NFR BLD AUTO: 0.4 % — SIGNIFICANT CHANGE UP (ref 0–2)
BUN SERPL-MCNC: 14 MG/DL — SIGNIFICANT CHANGE UP (ref 7–23)
CALCIUM SERPL-MCNC: 9.7 MG/DL — SIGNIFICANT CHANGE UP (ref 8.4–10.5)
CHLORIDE SERPL-SCNC: 106 MMOL/L — SIGNIFICANT CHANGE UP (ref 96–108)
CO2 SERPL-SCNC: 26 MMOL/L — SIGNIFICANT CHANGE UP (ref 22–31)
CREAT SERPL-MCNC: 1.02 MG/DL — SIGNIFICANT CHANGE UP (ref 0.5–1.3)
EGFR: 53 ML/MIN/1.73M2 — LOW
EOSINOPHIL # BLD AUTO: 0.01 K/UL — SIGNIFICANT CHANGE UP (ref 0–0.5)
EOSINOPHIL NFR BLD AUTO: 0.1 % — SIGNIFICANT CHANGE UP (ref 0–6)
GAS PNL BLDA: SIGNIFICANT CHANGE UP
GLUCOSE BLDC GLUCOMTR-MCNC: 135 MG/DL — HIGH (ref 70–99)
GLUCOSE BLDC GLUCOMTR-MCNC: 158 MG/DL — HIGH (ref 70–99)
GLUCOSE SERPL-MCNC: 125 MG/DL — HIGH (ref 70–99)
HCT VFR BLD CALC: 28.7 % — LOW (ref 34.5–45)
HGB BLD-MCNC: 9.5 G/DL — LOW (ref 11.5–15.5)
IMM GRANULOCYTES NFR BLD AUTO: 0.4 % — SIGNIFICANT CHANGE UP (ref 0–0.9)
LYMPHOCYTES # BLD AUTO: 1.33 K/UL — SIGNIFICANT CHANGE UP (ref 1–3.3)
LYMPHOCYTES # BLD AUTO: 15.7 % — SIGNIFICANT CHANGE UP (ref 13–44)
MAGNESIUM SERPL-MCNC: 2 MG/DL — SIGNIFICANT CHANGE UP (ref 1.6–2.6)
MCHC RBC-ENTMCNC: 32.4 PG — SIGNIFICANT CHANGE UP (ref 27–34)
MCHC RBC-ENTMCNC: 33.1 GM/DL — SIGNIFICANT CHANGE UP (ref 32–36)
MCV RBC AUTO: 98 FL — SIGNIFICANT CHANGE UP (ref 80–100)
MONOCYTES # BLD AUTO: 1.23 K/UL — HIGH (ref 0–0.9)
MONOCYTES NFR BLD AUTO: 14.5 % — HIGH (ref 2–14)
NEUTROPHILS # BLD AUTO: 5.84 K/UL — SIGNIFICANT CHANGE UP (ref 1.8–7.4)
NEUTROPHILS NFR BLD AUTO: 68.9 % — SIGNIFICANT CHANGE UP (ref 43–77)
NRBC # BLD: 0 /100 WBCS — SIGNIFICANT CHANGE UP (ref 0–0)
PHOSPHATE SERPL-MCNC: 3.1 MG/DL — SIGNIFICANT CHANGE UP (ref 2.5–4.5)
PLATELET # BLD AUTO: 110 K/UL — LOW (ref 150–400)
POTASSIUM SERPL-MCNC: 4.6 MMOL/L — SIGNIFICANT CHANGE UP (ref 3.5–5.3)
POTASSIUM SERPL-SCNC: 4.6 MMOL/L — SIGNIFICANT CHANGE UP (ref 3.5–5.3)
RBC # BLD: 2.93 M/UL — LOW (ref 3.8–5.2)
RBC # FLD: 12.9 % — SIGNIFICANT CHANGE UP (ref 10.3–14.5)
SODIUM SERPL-SCNC: 142 MMOL/L — SIGNIFICANT CHANGE UP (ref 135–145)
WBC # BLD: 8.47 K/UL — SIGNIFICANT CHANGE UP (ref 3.8–10.5)
WBC # FLD AUTO: 8.47 K/UL — SIGNIFICANT CHANGE UP (ref 3.8–10.5)

## 2024-03-22 PROCEDURE — C1894: CPT

## 2024-03-22 PROCEDURE — 80053 COMPREHEN METABOLIC PANEL: CPT

## 2024-03-22 PROCEDURE — 86900 BLOOD TYPING SEROLOGIC ABO: CPT

## 2024-03-22 PROCEDURE — 71045 X-RAY EXAM CHEST 1 VIEW: CPT | Mod: 26

## 2024-03-22 PROCEDURE — 85610 PROTHROMBIN TIME: CPT

## 2024-03-22 PROCEDURE — 99238 HOSP IP/OBS DSCHRG MGMT 30/<: CPT

## 2024-03-22 PROCEDURE — 36415 COLL VENOUS BLD VENIPUNCTURE: CPT

## 2024-03-22 PROCEDURE — 84100 ASSAY OF PHOSPHORUS: CPT

## 2024-03-22 PROCEDURE — 84132 ASSAY OF SERUM POTASSIUM: CPT

## 2024-03-22 PROCEDURE — 86901 BLOOD TYPING SEROLOGIC RH(D): CPT

## 2024-03-22 PROCEDURE — 83880 ASSAY OF NATRIURETIC PEPTIDE: CPT

## 2024-03-22 PROCEDURE — 80048 BASIC METABOLIC PNL TOTAL CA: CPT

## 2024-03-22 PROCEDURE — 97161 PT EVAL LOW COMPLEX 20 MIN: CPT

## 2024-03-22 PROCEDURE — 86923 COMPATIBILITY TEST ELECTRIC: CPT

## 2024-03-22 PROCEDURE — 93010 ELECTROCARDIOGRAM REPORT: CPT

## 2024-03-22 PROCEDURE — 85027 COMPLETE CBC AUTOMATED: CPT

## 2024-03-22 PROCEDURE — 82962 GLUCOSE BLOOD TEST: CPT

## 2024-03-22 PROCEDURE — L8699: CPT

## 2024-03-22 PROCEDURE — 93321 DOPPLER ECHO F-UP/LMTD STD: CPT

## 2024-03-22 PROCEDURE — 93306 TTE W/DOPPLER COMPLETE: CPT

## 2024-03-22 PROCEDURE — 71045 X-RAY EXAM CHEST 1 VIEW: CPT

## 2024-03-22 PROCEDURE — 84295 ASSAY OF SERUM SODIUM: CPT

## 2024-03-22 PROCEDURE — C1725: CPT

## 2024-03-22 PROCEDURE — C1887: CPT

## 2024-03-22 PROCEDURE — 83735 ASSAY OF MAGNESIUM: CPT

## 2024-03-22 PROCEDURE — C1889: CPT

## 2024-03-22 PROCEDURE — 93005 ELECTROCARDIOGRAM TRACING: CPT

## 2024-03-22 PROCEDURE — 82330 ASSAY OF CALCIUM: CPT

## 2024-03-22 PROCEDURE — C1760: CPT

## 2024-03-22 PROCEDURE — 82803 BLOOD GASES ANY COMBINATION: CPT

## 2024-03-22 PROCEDURE — 85025 COMPLETE CBC W/AUTO DIFF WBC: CPT

## 2024-03-22 PROCEDURE — 85730 THROMBOPLASTIN TIME PARTIAL: CPT

## 2024-03-22 PROCEDURE — C1769: CPT

## 2024-03-22 PROCEDURE — 86850 RBC ANTIBODY SCREEN: CPT

## 2024-03-22 RX ORDER — ACETAMINOPHEN 500 MG
2 TABLET ORAL
Qty: 112 | Refills: 0
Start: 2024-03-22 | End: 2024-04-04

## 2024-03-22 RX ORDER — HYDRALAZINE HCL 50 MG
1 TABLET ORAL
Refills: 0 | DISCHARGE

## 2024-03-22 RX ORDER — AMLODIPINE BESYLATE 2.5 MG/1
1 TABLET ORAL
Refills: 0 | DISCHARGE

## 2024-03-22 RX ORDER — ASPIRIN/CALCIUM CARB/MAGNESIUM 324 MG
1 TABLET ORAL
Qty: 30 | Refills: 0
Start: 2024-03-22 | End: 2024-04-20

## 2024-03-22 RX ORDER — SODIUM CHLORIDE 9 MG/ML
3 INJECTION INTRAMUSCULAR; INTRAVENOUS; SUBCUTANEOUS EVERY 8 HOURS
Refills: 0 | Status: DISCONTINUED | OUTPATIENT
Start: 2024-03-22 | End: 2024-03-22

## 2024-03-22 RX ORDER — LISINOPRIL 2.5 MG/1
1 TABLET ORAL
Refills: 0 | DISCHARGE

## 2024-03-22 RX ADMIN — PANTOPRAZOLE SODIUM 40 MILLIGRAM(S): 20 TABLET, DELAYED RELEASE ORAL at 06:11

## 2024-03-22 RX ADMIN — HEPARIN SODIUM 5000 UNIT(S): 5000 INJECTION INTRAVENOUS; SUBCUTANEOUS at 06:11

## 2024-03-22 RX ADMIN — Medication 81 MILLIGRAM(S): at 11:37

## 2024-03-22 NOTE — DISCHARGE NOTE PROVIDER - NSDCCPTREATMENT_GEN_ALL_CORE_FT
PRINCIPAL PROCEDURE  Procedure: TAVR, percutaneous  Findings and Treatment: TAVR (Lise 23mm), EF normal

## 2024-03-22 NOTE — DISCHARGE NOTE PROVIDER - HOSPITAL COURSE
Patient discussed on morning rounds with Dr. Padilla/Guera    Operation Date: TF TAVR (Lise 23 mm), EF nml     Primary Surgeon/Attending MD: Dr. Padilla    Referring Physician: Dr. Junior Oliveira   _ _ _ _ _ _ _ _ _ _ _ _  HOSPITAL COURSE:    _ _ _ _ _ _ _ _ _ _ _ _  DISCHARGE PHYSICAL EXAM:  General: Sitting in chair comfortably in NAD  Neuro: A&Ox3, no focal deficits   HEENT: NCAT, EOMI   Cardiac: Regular rate and rhythm, normal S1 and S2. No m/r/g   Pulm: Breathing comfortably on RA. No signs of respiratory distress. Lungs are CTA b/l without wheezes, rales, or rhonchi   Abdomen: Soft, non-distended, non-tender. + bowel sounds   : Purewick in place  Extremities: Warm and well perfused, no peripheral edema, distal pulses 2+. No calf tenderness. SCDs and TEDs in place  MSK: Full AROM   Wound: Groins without erythema or hematoma. No saturation appreciated in groin dressings.   _ _ _ _   _ _ _ _ _ _ _ _  REMOVAL CHECKLIST:        [ N/a] Epicardial wires          [ N/a] Stitches/tie downs,   If no, why? ______          [N/a ] PICC/Midline,   If no, why? ________  _ _ _ _ _ _ _ _ _ _ _ _   MEDICATION DISCHARGE CHECKLIST      TAVR Valve        [x ] Aspirin, [  ] Contraindicated, Reason_______________________________        [ ] Plavix, [ x] Contraindicated, Reason not indicated per Dr. Padilla    _ _ _ _ _ _ _ _ _ _ _ _  RELEVANT LABS/IMAGING:  TTE Echo Complete w/o Contrast w/ Doppler (03.21.24 @ 10:27)     CONCLUSIONS:     1. Hyperdynamic left ventricular systolic function.   2. Normal right ventricular systolic function.   3. Mildly dilated left atrium.   4. 23mm Lise 3 Ultra TAVR valve is seen in the aortic position with   normal function, without evidence of prosthetic regurgitation.   5. No other significant valvular disease.   6. No pericardial effusion.    _ _ _ _ _ _ _ _ _ _ _ _  CLINICAL FOLLOW UP NEEDS:     [ NO] Labwork           Labs needed:           When/Timing:           Outpatient team aware: YES/NO         [NO ] Imaging            Type:           When/Timing:           Outpatient team aware: YES/NO       [ YES] Home equipment           Type: MCOT            Specific needs:MCOT           Outpatient team aware: YES  _ _ _ _ _ _ _ _ _ _ _ _  Over 35 minutes was spent with the patient reviewing the discharge material including medications, follow up appointments, recovery, concerning symptoms, and how to contact their health care providers if they have questions   Patient discussed on morning rounds with Dr. Padilla/Guera    Operation Date: TF TAVR (Lise 23 mm), EF nml     Primary Surgeon/Attending MD: Dr. Padilla    Referring Physician: Dr. Junior Oliveira   _ _ _ _ _ _ _ _ _ _ _ _  HOSPITAL COURSE:  89 y/o, Cantonese-speaking, F with a PMHx of anxiety, HTN, HLD, T2DM, PVD s/p vein procedure (in [place], 2010), chronic low back pain, HFpEF and symptomatic critical aortic stenosis (TTE 1/6/24: LVEF 55-60%, MG 61.7, PV 4.68, ANUP 0.6). Pt was at her baseline state of health until 12/31/23 when she started experiencing chest pain with minimal exertion. was referred to a cardiologist. TTE on 1/6/24 showed normal BiV function, grade I diastolic dysfunction, severe AS (MG 61, PV 4.68).  Patient was referred for TAVR evaluation and deemed a candidate for a TAVR. She presented to [facility] on 3/21/24 for TAVR (23 mm Lise). Intraoperatively, there was some widening of her QRS seen but upon arrival to the CT step down unit her QRS was narrowed. She required 1 push of hydralazine for elevated BP. Her echo was performed and showed no paravalvular leak. On POD 1, her rhythm remained stable, groins were without hematoma. Per Dr. Padilla, she was cleared for discharge home. At time of discharge, she is hemodynamically stable, voiding well, and ambulating well. She will be discharged home with ASA 81 mg and MCOT. She was informed to hold her home BP medications until Saturday 3/24/24 and to resume medications as needed for elevated BP.   _ _ _ _ _ _ _ _ _ _ _ _  DISCHARGE PHYSICAL EXAM:  General: Sitting in chair comfortably in NAD  Neuro: A&Ox3, no focal deficits   HEENT: NCAT, EOMI   Cardiac: Regular rate and rhythm, normal S1 and S2. No m/r/g   Pulm: Breathing comfortably on RA. No signs of respiratory distress. Lungs are CTA b/l without wheezes, rales, or rhonchi   Abdomen: Soft, non-distended, non-tender. + bowel sounds   : Purewick in place  Extremities: Warm and well perfused, no peripheral edema, distal pulses 2+. No calf tenderness. SCDs and TEDs in place  MSK: Full AROM   Wound: Groins without erythema or hematoma. No saturation appreciated in groin dressings.   _ _ _ _   _ _ _ _ _ _ _ _  REMOVAL CHECKLIST:        [ N/a] Epicardial wires          [ N/a] Stitches/tie downs,   If no, why? ______          [N/a ] PICC/Midline,   If no, why? ________  _ _ _ _ _ _ _ _ _ _ _ _   MEDICATION DISCHARGE CHECKLIST      TAVR Valve        [x ] Aspirin, [  ] Contraindicated, Reason_______________________________        [ ] Plavix, [ x] Contraindicated, Reason not indicated per Dr. Padilla    _ _ _ _ _ _ _ _ _ _ _ _  RELEVANT LABS/IMAGING:  TTE Echo Complete w/o Contrast w/ Doppler (03.21.24 @ 10:27)     CONCLUSIONS:     1. Hyperdynamic left ventricular systolic function.   2. Normal right ventricular systolic function.   3. Mildly dilated left atrium.   4. 23mm Lise 3 Ultra TAVR valve is seen in the aortic position with   normal function, without evidence of prosthetic regurgitation.   5. No other significant valvular disease.   6. No pericardial effusion.    _ _ _ _ _ _ _ _ _ _ _ _  CLINICAL FOLLOW UP NEEDS:     [ NO] Labwork           Labs needed:           When/Timing:           Outpatient team aware: YES/NO         [NO ] Imaging            Type:           When/Timing:           Outpatient team aware: YES/NO       [ YES] Home equipment           Type: OT            Specific needs:OT           Outpatient team aware: YES  _ _ _ _ _ _ _ _ _ _ _ _  Over 35 minutes was spent with the patient reviewing the discharge material including medications, follow up appointments, recovery, concerning symptoms, and how to contact their health care providers if they have questions   Patient discussed on morning rounds with Dr. Padilla/Guera    Operation Date: TF TAVR (Lise 23 mm), EF nml     Primary Surgeon/Attending MD: Dr. Padilla    Referring Physician: Dr. Junior Oliveira   _ _ _ _ _ _ _ _ _ _ _ _  HOSPITAL COURSE:  89 y/o, Cantonese-speaking, F with a PMHx of anxiety, HTN, HLD, T2DM, PVD s/p vein procedure (in [place], 2010), chronic low back pain, HFpEF and symptomatic critical aortic stenosis (TTE 1/6/24: LVEF 55-60%, MG 61.7, PV 4.68, ANUP 0.6). Pt was at her baseline state of health until 12/31/23 when she started experiencing chest pain with minimal exertion. was referred to a cardiologist. TTE on 1/6/24 showed normal BiV function, grade I diastolic dysfunction, severe AS (MG 61, PV 4.68).  Patient was referred for TAVR evaluation and deemed a candidate for a TAVR. She presented to [facility] on 3/21/24 for TAVR (23 mm Lise). Intraoperatively, there was some widening of her QRS seen but upon arrival to the CT step down unit her QRS was narrowed. She required 1 push of hydralazine for elevated BP. Her echo was performed and showed no paravalvular leak. On POD 1, her rhythm remained stable, groins were without hematoma. Per Dr. Padilla, she was cleared for discharge home. PT evaluated patient and she had no needs for home. At time of discharge, she is hemodynamically stable, voiding well, and ambulating well. She will be discharged home with ASA 81 mg and MCOT. She was informed to hold her home BP medications until Saturday 3/24/24 and to resume medications as needed for elevated BP.   _ _ _ _ _ _ _ _ _ _ _ _  DISCHARGE PHYSICAL EXAM:  General: Sitting in chair comfortably in NAD  Neuro: A&Ox3, no focal deficits   HEENT: NCAT, EOMI   Cardiac: Regular rate and rhythm, normal S1 and S2. No m/r/g   Pulm: Breathing comfortably on RA. No signs of respiratory distress. Lungs are CTA b/l without wheezes, rales, or rhonchi   Abdomen: Soft, non-distended, non-tender. + bowel sounds   : Purewick in place  Extremities: Warm and well perfused, no peripheral edema, distal pulses 2+. No calf tenderness. SCDs and TEDs in place  MSK: Full AROM   Wound: Groins without erythema or hematoma. No saturation appreciated in groin dressings.   _ _ _ _   _ _ _ _ _ _ _ _  REMOVAL CHECKLIST:        [ N/a] Epicardial wires          [ N/a] Stitches/tie downs,   If no, why? ______          [N/a ] PICC/Midline,   If no, why? ________  _ _ _ _ _ _ _ _ _ _ _ _   MEDICATION DISCHARGE CHECKLIST      TAVR Valve        [x ] Aspirin, [  ] Contraindicated, Reason_______________________________        [ ] Plavix, [ x] Contraindicated, Reason not indicated per Dr. Padilla    _ _ _ _ _ _ _ _ _ _ _ _  RELEVANT LABS/IMAGING:  TTE Echo Complete w/o Contrast w/ Doppler (03.21.24 @ 10:27)     CONCLUSIONS:     1. Hyperdynamic left ventricular systolic function.   2. Normal right ventricular systolic function.   3. Mildly dilated left atrium.   4. 23mm Lise 3 Ultra TAVR valve is seen in the aortic position with   normal function, without evidence of prosthetic regurgitation.   5. No other significant valvular disease.   6. No pericardial effusion.    _ _ _ _ _ _ _ _ _ _ _ _  CLINICAL FOLLOW UP NEEDS:     [ NO] Labwork           Labs needed:           When/Timing:           Outpatient team aware: YES/NO         [NO ] Imaging            Type:           When/Timing:           Outpatient team aware: YES/NO       [ YES] Home equipment           Type: MCOT            Specific needs:MCOT           Outpatient team aware: YES  _ _ _ _ _ _ _ _ _ _ _ _  Over 35 minutes was spent with the patient reviewing the discharge material including medications, follow up appointments, recovery, concerning symptoms, and how to contact their health care providers if they have questions

## 2024-03-22 NOTE — PHYSICAL THERAPY INITIAL EVALUATION ADULT - ADDITIONAL COMMENTS
ambulates at home with a cane independently, usually does not amb outside her home. 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 Patient has a walk-in shower with shower chair. Is usually limited in amb due to lower back pain that radiates to bilateral LE posteriorly

## 2024-03-22 NOTE — DISCHARGE NOTE PROVIDER - NSDCFUADDAPPT_GEN_ALL_CORE_FT
Please follow-up with Dr. Oliveira in 1-2 weeks. Call their office to schedule your appointment.     Our office will call you with the timing of your discharge appointments. The number is [phone]. Please call with any questions or concerns.     Please hold all your blood pressure medications until Saturday 3/24/24. On this date, you can resume your amlodipine 5mg. Continue to introduce additional medications if your blood pressure remain elevated. Call Dr. Oliveira or Dr. Padilla's office with any questions or concerns.  Please follow-up with Dr. Oliveira in 1-2 weeks. Call their office to schedule your appointment.     Your appointment will be with Dr. Padilla's NP Edison through telemedicine. The office will call you with the instructions for how to do this. The number is [phone]     Please hold all your blood pressure medications until Saturday 3/24/24. On this date, you can resume your amlodipine 5mg. Continue to introduce additional medications if your blood pressure remain elevated. Call Dr. Oliveira or Dr. Padilla's office with any questions or concerns.

## 2024-03-22 NOTE — DISCHARGE NOTE PROVIDER - CARE PROVIDER_API CALL
Kartik Padilla  Interventional Cardiology  [address]  [place] [address]  Phone: [phone]  Fax: [phone]  Follow Up Time: 1 week   Kartik Padilla  Interventional Cardiology  [address]  [place] [address]  Phone: [phone]  Fax: [phone]  Scheduled Appointment: 03/29/2024 09:30 AM

## 2024-03-22 NOTE — PHYSICAL THERAPY INITIAL EVALUATION ADULT - PERTINENT HX OF CURRENT PROBLEM, REHAB EVAL
English
89 year old female who was at her baseline state of health until 12/31/23 when she started experiencing chest pain with minimal exertion. was referred to a cardiologist. TTE on 1/6/24 showed normal BiV function, grade I diastolic dysfunction, severe AS (MG 61, PV 4.68). Referred for TAVR evaluation and deemed a good candidate. Presents to [facility] on 3/21/24 for planned intervention.

## 2024-03-22 NOTE — DISCHARGE NOTE PROVIDER - NSDCMRMEDTOKEN_GEN_ALL_CORE_FT
acarbose 25 mg oral tablet: 1 tab(s) orally 3 times a day (with meals)  amLODIPine 5 mg oral tablet: 1 tab(s) orally once a day  Aspirin EC 81 mg oral delayed release tablet: 1 tab(s) orally once a day dispense enteric-coated.  once a day for moderate coronary artery disease.  atorvastatin 40 mg oral tablet: 1 tab(s) orally once a day  Colace 100 mg oral capsule: 1 cap(s) orally 2 times a day  famotidine 40 mg oral tablet: 1 tab(s) orally once a day  folic acid 1 mg oral tablet: 1 tab(s) orally once a day  hydrALAZINE 10 mg oral tablet: 1 tab(s) orally every 6 hours take every 6 hours as needed for CBP &gt;150  lisinopril 5 mg oral tablet: 1 tab(s) orally once a day  naproxen 375 mg oral tablet: 1 tab(s) orally 2 times a day as needed for  pain  senna (sennosides) 8.6 mg oral tablet: 1 tab(s) orally 2 times a day  Tradjenta 5 mg oral tablet: 1 tab(s) orally once a day   acarbose 25 mg oral tablet: 1 tab(s) orally 3 times a day (with meals)  acetaminophen 325 mg oral tablet: 2 tab(s) orally every 6 hours as needed for  mild pain  aspirin 81 mg oral delayed release tablet: 1 tab(s) orally once a day  atorvastatin 40 mg oral tablet: 1 tab(s) orally once a day  Colace 100 mg oral capsule: 1 cap(s) orally 2 times a day  famotidine 40 mg oral tablet: 1 tab(s) orally once a day  folic acid 1 mg oral tablet: 1 tab(s) orally once a day  hydrALAZINE 10 mg oral tablet: 1 tab(s) orally every 6 hours take every 6 hours as needed for CBP &gt;150  naproxen 375 mg oral tablet: 1 tab(s) orally 2 times a day as needed for  pain  senna (sennosides) 8.6 mg oral tablet: 1 tab(s) orally 2 times a day  Tradjenta 5 mg oral tablet: 1 tab(s) orally once a day   acarbose 25 mg oral tablet: 1 tab(s) orally 3 times a day (with meals)  acetaminophen 325 mg oral tablet: 2 tab(s) orally every 6 hours as needed for  mild pain  aspirin 81 mg oral delayed release tablet: 1 tab(s) orally once a day  atorvastatin 40 mg oral tablet: 1 tab(s) orally once a day  Colace 100 mg oral capsule: 1 cap(s) orally 2 times a day  famotidine 40 mg oral tablet: 1 tab(s) orally once a day  folic acid 1 mg oral tablet: 1 tab(s) orally once a day  naproxen 375 mg oral tablet: 1 tab(s) orally 2 times a day as needed for  pain  senna (sennosides) 8.6 mg oral tablet: 1 tab(s) orally 2 times a day  Tradjenta 5 mg oral tablet: 1 tab(s) orally once a day

## 2024-03-22 NOTE — DISCHARGE NOTE PROVIDER - NSDCFUADDINST_GEN_ALL_CORE_FT
-Walk daily as tolerated and use your incentive spirometer 10 times every hour while you are awake.     -Please weigh yourself daily. If you notice over a 3 pound weight gain in 3 days, this is a sign you are likely retaining too much fluid. It is imperative you call our right away with unexplained rapid weight gain.      -Please continue to wear the compression stockings given to you in the hospital at home. This is a way to prevent fluid from building up in your legs.     -No driving or strenuous activity/exercise until cleared by your surgeon.    -Gently clean your incisions with unscented/antibacterial soap and water, pat dry.  You may leave them open to air.    -Call your doctor if you have shortness of breath, chest pain not relieved by pain medication, dizziness, fever >101.5, or increased redness or drainage from incisions.    You had a MCOT monitor (an external cardiac rhythm monitoring device) placed on your day of discharge.  This helps us monitor your heart while you are out of the hospital for 30 days after discharge. Should your heart go into an abnormal or dangerous rhythm you will receieve a call from the MCOT team and your Structural Heart team of Doctors and PA's will be notified.    1. Keep the monitor within 30 feet of you at all times.  2. When you feel any symptom (chest pain, dizziness, palpitations, weakness, fatigue or anything outside of your normal), press the “Record Symptoms” button on the main phone of your phone  3. Shower or exercise as normal while wearing the MCOT Patch. Do not swim or take a bath. Patch is water-resistant, not waterproof  4. When the battery is low on the phone or on the device, use the supplied . The monitor will show a warning message when the battery is low.  5. Do not remove the patch from your skin after you begin monitoring. With normal wear, each patch should last 5 days. To replace the patch follow instructions in the MCOT box with the Patch Guide  6. Any issues with the MCOT device or phone please call Customer Service at [phone].  7. If you have any other questions at all please call the Structural Heart office at [phone]

## 2024-03-22 NOTE — DISCHARGE NOTE NURSING/CASE MANAGEMENT/SOCIAL WORK - NSDCFUADDAPPT_GEN_ALL_CORE_FT
Please follow-up with Dr. Oliveira in 1-2 weeks. Call their office to schedule your appointment.     Our office will call you with the timing of your discharge appointments. The number is [phone]. Please call with any questions or concerns.     Please hold all your blood pressure medications until Saturday 3/24/24. On this date, you can resume your amlodipine 5mg. Continue to introduce additional medications if your blood pressure remain elevated. Call Dr. Oliveira or Dr. Padilla's office with any questions or concerns.

## 2024-03-22 NOTE — DISCHARGE NOTE PROVIDER - NSDCFUSCHEDAPPT_GEN_ALL_CORE_FT
Edison Vidal  [facility]  [facility] [address]  Scheduled Appointment: 03/29/2024

## 2024-03-22 NOTE — DISCHARGE NOTE PROVIDER - NSDCHHNEEDSERVICE_GEN_ALL_CORE
Provider e-prescribed prescription to the pharmacy.    Observation and assessment/Wound care and assessment

## 2024-03-22 NOTE — DISCHARGE NOTE PROVIDER - PROVIDER TOKENS
PROVIDER:[TOKEN:[[id number]],FOLLOWUP:[1 week]] PROVIDER:[TOKEN:[[id number]],SCHEDULEDAPPT:[03/29/2024],SCHEDULEDAPPTTIME:[09:30 AM]]

## 2024-03-22 NOTE — DISCHARGE NOTE NURSING/CASE MANAGEMENT/SOCIAL WORK - PATIENT PORTAL LINK FT
You can access the FollowMyHealth Patient Portal offered by [facility] by registering at the following website: [email or web address]. By joining [facility]’s FollowMyHealth portal, you will also be able to view your health information using other applications (apps) compatible with our system.

## 2024-03-23 ENCOUNTER — TRANSCRIPTION ENCOUNTER (OUTPATIENT)
Age: 89
End: 2024-03-23

## 2024-03-24 ENCOUNTER — TRANSCRIPTION ENCOUNTER (OUTPATIENT)
Age: 89
End: 2024-03-24

## 2024-03-25 ENCOUNTER — NON-APPOINTMENT (OUTPATIENT)
Age: 89
End: 2024-03-25

## 2024-03-25 RX ORDER — AMLODIPINE BESYLATE 5 MG/1
5 TABLET ORAL DAILY
Refills: 0 | Status: DISCONTINUED | COMMUNITY
Start: 2023-06-21 | End: 2024-03-25

## 2024-03-25 RX ORDER — HYDRALAZINE HYDROCHLORIDE 10 MG/1
10 TABLET ORAL
Refills: 0 | Status: DISCONTINUED | COMMUNITY
Start: 2024-01-05 | End: 2024-03-25

## 2024-03-25 RX ORDER — LISINOPRIL 5 MG/1
5 TABLET ORAL DAILY
Refills: 0 | Status: DISCONTINUED | COMMUNITY
Start: 2023-09-30 | End: 2024-03-25

## 2024-03-28 ENCOUNTER — APPOINTMENT (OUTPATIENT)
Dept: CARE COORDINATION | Facility: HOME HEALTH | Age: 89
End: 2024-03-28

## 2024-03-28 DIAGNOSIS — Z98.890 OTHER SPECIFIED POSTPROCEDURAL STATES: ICD-10-CM

## 2024-03-28 DIAGNOSIS — Z95.2 PRESENCE OF PROSTHETIC HEART VALVE: ICD-10-CM

## 2024-03-28 PROBLEM — I73.9 PERIPHERAL VASCULAR DISEASE, UNSPECIFIED: Chronic | Status: ACTIVE | Noted: 2024-02-14

## 2024-03-28 PROBLEM — I50.32 CHRONIC DIASTOLIC (CONGESTIVE) HEART FAILURE: Chronic | Status: ACTIVE | Noted: 2024-03-20

## 2024-03-28 RX ORDER — LORATADINE 10 MG
17 TABLET,DISINTEGRATING ORAL DAILY
Qty: 1 | Refills: 0 | Status: ACTIVE | COMMUNITY
Start: 2024-03-28 | End: 1900-01-01

## 2024-03-28 NOTE — ASSESSMENT
[FreeTextEntry1] : Ms Bueno is recovering well at home s/p TAVR accompanied by her granddaughter Humera Main and daughter Shanta Vincent. Reviewed all medications and dosages with patient understanding. Patient has all medications in home and is taking as prescribed. Pain controlled with current medication regimen. No new symptoms, issues or concerns. Reports ambulating around home independently, without the use if assistive device. Denies chest pain, SOB/MCKEON, nausea/vomiting. Per patient she has not had a BM since being discharged to home, has been compliant with Colace and Senna.  PLAN OF CARE  -MiraLAX BID until BM is achieved, then daily.   -Shower let water run over incision use antibacterial soap and pat dry; avoid all creams, ointments or lotions leave open to air.   -Encourage increased ambulation to include outdoors; avoiding extreme temperatures.  -Start daily weights today; call immediately for weight gain >3lbs in a day/5lbs in a week.     Follow Up:  Kartik Padilla  Interventional Cardiology  [address]  [place] [address]  Phone: [phone]  Fax: [phone]  Scheduled Appointment: 03/29/2024 09:30 AM

## 2024-03-28 NOTE — HISTORY OF PRESENT ILLNESS
[FreeTextEntry1] : [facility]: [facility]  Post discharge follow-up and assessment   Operation Date: TF TAVR (Lise 23 mm), EF nml: Primary Surgeon/Attending MD: Dr. Valerie Riley Ximena is a 89 y/o, Cantonese-speaking, F with a PMHx of anxiety, HTN, HLD, T2DM, PVD s/p vein procedure (in [place], 2010), chronic low back pain, HFpEF and symptomatic critical aortic stenosis (TTE 1/6/24: LVEF 55-60%, MG 61.7, PV 4.68, ANUP 0.6). Pt was at her baseline state of health until 12/31/23 when she started experiencing chest pain with minimal exertion. was referred to a cardiologist. TTE on 1/6/24 showed normal BiV function, grade I diastolic dysfunction, severe AS (MG 61, PV 4.68). Patient was referred for TAVR evaluation and deemed a candidate for a TAVR. She presented to [facility] on 3/21/24 for TAVR (23 mm Lise). Intraop course uncomplicated discharged to home on 3/22/2024.  Seen today by [facility], sitting in chair in NAD, bilateral groin sites with moderate resolving ecchymosis healing well.

## 2024-03-28 NOTE — PHYSICAL EXAM
Phone call to patient to provide biopsy results and discuss Provider treatment recommendations.    Patient to be contacted by Dr Graf's staff to schedule Mohs.      PSR to contact patient for a 6 month FBSE, (please leave 1 year skin check scheduled also)      Collected 1/30/2023 10:53     Status: Final result     Visible to patient: No (scheduled for 2/9/2023 10:12 AM)     Dx: Neoplasm of uncertain behavior of skin       Component    Pathologic Diagnosis   A.  Skin, left posterior upper arm, shave removal:  -Seborrheic keratosis, inflamed.     B. Skin, posterior frontal scalp, shave biopsy:  -Basal cell carcinoma, superficial and nodular types.  -Lesion extends to lateral margins.   Electronically signed by Mariella Alonzo MD on 2/2/2023 at 1012           [Sclera] : the sclera and conjunctiva were normal [Respiration, Rhythm And Depth] : normal respiratory rhythm and effort [Jugular Venous Distention Increased] : there was no jugular-venous distention [Exaggerated Use Of Accessory Muscles For Inspiration] : no accessory muscle use [FreeTextEntry1] : MCOT in place [Skin Color & Pigmentation] : normal skin color and pigmentation [Oriented To Time, Place, And Person] : oriented to person, place, and time [Impaired Insight] : insight and judgment were intact [Affect] : the affect was normal

## 2024-03-28 NOTE — REASON FOR VISIT
[Follow-Up: _____] : a [unfilled] follow-up visit [Ad Hoc ] : provided by an ad hoc  [Interpreters_Relationshiptopatient] : Grand daughter [Interpreters_FullName] : Humera Main [FreeTextEntry3] : Patient requests granddaughter as Ad Hoc  [TWNoteComboBox1] : Margo

## 2024-03-29 ENCOUNTER — APPOINTMENT (OUTPATIENT)
Dept: CARDIOTHORACIC SURGERY | Facility: CLINIC | Age: 89
End: 2024-03-29
Payer: MEDICARE

## 2024-03-29 DIAGNOSIS — I11.0 HYPERTENSIVE HEART DISEASE WITH HEART FAILURE: ICD-10-CM

## 2024-03-29 DIAGNOSIS — M54.50 LOW BACK PAIN, UNSPECIFIED: ICD-10-CM

## 2024-03-29 DIAGNOSIS — E78.5 HYPERLIPIDEMIA, UNSPECIFIED: ICD-10-CM

## 2024-03-29 DIAGNOSIS — Z79.84 LONG TERM (CURRENT) USE OF ORAL HYPOGLYCEMIC DRUGS: ICD-10-CM

## 2024-03-29 DIAGNOSIS — Z00.6 ENCOUNTER FOR EXAMINATION FOR NORMAL COMPARISON AND CONTROL IN CLINICAL RESEARCH PROGRAM: ICD-10-CM

## 2024-03-29 DIAGNOSIS — F41.9 ANXIETY DISORDER, UNSPECIFIED: ICD-10-CM

## 2024-03-29 DIAGNOSIS — I35.0 NONRHEUMATIC AORTIC (VALVE) STENOSIS: ICD-10-CM

## 2024-03-29 DIAGNOSIS — G89.29 OTHER CHRONIC PAIN: ICD-10-CM

## 2024-03-29 DIAGNOSIS — Z79.82 LONG TERM (CURRENT) USE OF ASPIRIN: ICD-10-CM

## 2024-03-29 DIAGNOSIS — E11.51 TYPE 2 DIABETES MELLITUS WITH DIABETIC PERIPHERAL ANGIOPATHY WITHOUT GANGRENE: ICD-10-CM

## 2024-03-29 DIAGNOSIS — I50.32 CHRONIC DIASTOLIC (CONGESTIVE) HEART FAILURE: ICD-10-CM

## 2024-03-29 PROCEDURE — 99213 OFFICE O/P EST LOW 20 MIN: CPT

## 2024-03-29 NOTE — REVIEW OF SYSTEMS
[Abdominal Pain] : no abdominal pain [Nausea] : no nausea [Vomiting] : no vomiting [Constipation] : constipation [Dizziness] : dizziness [Tremor] : no tremor was seen [Convulsions] : no convulsions [Numbness (Hypoesthesia)] : no numbness [Tingling (Paresthesia)] : no tingling [Limb Weakness (Paresis)] : no limb weakness (Paresis) [Weakness] : no weakness [Speech Disturbance] : no speech disturbance [Negative] : Heme/Lymph

## 2024-03-29 NOTE — RESULTS/DATA
[TextEntry] : TTE 1/6/24: LVEF 55-60%, grade I diastolic dysfunction, normal RV function, trileaflet aortic valve, severe aortic stenosis, PG 87.8, MG 61.7, PV 4.68, ANUP (VTI) 0.6cm2, structurally normal mitral valve with no regurgitation, structurally normal tricuspid valve with no regurgitation, structurally normal pulmonic valve with no regurgitation, no e/o pericardial effusion.   EKG 1/6/24: SR 89, ND 145ms, QRS 98ms, QTc 395  KCCQ done on 1/22/4  5MWT 1/22/24: 50:37s, 49:40s, 50:10s  Post TAVR TTE on 3/21/24 showed: CONCLUSIONS:  1. Hyperdynamic left ventricular systolic function.  2. Normal right ventricular systolic function.  3. Mildly dilated left atrium.  4. 23mm Lise 3 Ultra TAVR valve is seen in the aortic position with normal function (PV 1.74, PG 12/MG 6, ANUP 1.95cm2), without evidence of prosthetic regurgitation.  5. No other significant valvular disease.  6. No pericardial effusion.

## 2024-03-29 NOTE — REASON FOR VISIT
[Structural Heart and Valve Disease] : structural heart and valve disease [Home] : at home, [unfilled] , at the time of the visit. [Medical Office: ([facility])___] : at the medical office located in  [Family Member] : family member [Patient] : the patient [Self] : self [FreeTextEntry4] : granddaughter: Humera

## 2024-03-29 NOTE — PLAN
[TextEntry] : - continue current medication regimen.  - follow up with her cardiologist, Dr. Aparna Oliveira next week as scheduled. - resume anti-hypertensive (Lisinopril 5mg daily first) should SBP > 140.  - IE Abx ppx 30-60 min prior to dental procedure. - return to [facility] in 3 weeks with an OV with Dr. Padilla, TTE and EKG for her 1-month post TAVR visit, or as needed.

## 2024-03-29 NOTE — ASSESSMENT
[FreeTextEntry1] : 89F, Cantonese speaking, with PHM of anxiety, HTN, HLD, T2DM, PVD s/p vein procedure (in [place], 2010), chronic low back pain, HFpEF and symptomatic critical aortic stenosis (TTE 1/6/24: LVEF 55-60%, MG 61.7, PV 4.68, ANUP 0.6) s/p R-TF TAVR ([id number]) on 3/21/24 who presents for follow up after discharge.   The patient is clinically stable, NYHA Class II. The patient reports improving symptoms since discharge, no concerning symptoms of volume overload, and access sites are healing appropriately. MCOT reviewed, no high grade AVB event thus far. The ECHO done prior to discharge was reviewed. The patient has an upcoming visit with her cardiologist, Dr. Aparna Oliveira in a week. Recommend resuming her anti-hypertensives should her SBP > 140 or per Dr. Oliveira's direction with her visit next week. Also advised patient to slowly change in position given lightheadedness with change in position. Reminded patient to avoid routine dental visit/procedure for the next 3 months and that IE Abx ppx prior to any dental procedure is needed indefinitely moving forward. The patient will return in three weeks with ECHO/EKG/labs for his/her 1-month post TAVR follow up. All questions were answered.

## 2024-03-29 NOTE — HISTORY OF PRESENT ILLNESS
[FreeTextEntry1] : Referring cardiologist: Dr. Aparna Oliveira  Granddaughter, Humera, performed interpretation per patient preference.   89F, Cantonese speaking, with PHM of anxiety, HTN, HLD, T2DM, PVD s/p vein procedure (in [place], 2010), chronic low back pain, HFpEF and symptomatic critical aortic stenosis (TTE 1/6/24: LVEF 55-60%, MG 61.7, PV 4.68, ANUP 0.6) s/p R-TF TAVR ([id number]) on 3/21/24 who presents for follow up after discharge.   On 3/21/24 patient underwent successful TF TAVR using a 23mm S3 Ultra THV via the right CFA with transient widening of her QRS intra-procedure but had return back to narrow complex upon arrival to the step-down unit. Course was notable for hypertension requiring PRN hydralazine x 1 post procedure. Post TAVR TTE showed normal function THV with no pericardial effusion. Patient was discharged home on 3/22/24 with MCOT.   MCOT reviewed that showed narrow QRS, SR. No high grade AVB or pauses appreciated.   Since hospital discharge, patient reports feeling well. Walking around the house with no issue. Endorses occasional lightheadedness with quick change in position. Access sites are healing well. Her home BPs had been 110s/60-70s, thus has not resumed her Lisinopril 5 mg daily and amlodipine 5mg daily, weight is stable. Has been having constipation which is managed with OTC laxatives. Denies any recent chest pain, shortness of breath, palpitations, syncope, orthopnea, PND, LE edema, abdominal bloating, fever, chills, or dysuria.

## 2024-04-01 ENCOUNTER — NON-APPOINTMENT (OUTPATIENT)
Age: 89
End: 2024-04-01

## 2024-05-06 ENCOUNTER — APPOINTMENT (OUTPATIENT)
Dept: CARDIOTHORACIC SURGERY | Facility: CLINIC | Age: 89
End: 2024-05-06

## 2024-05-06 ENCOUNTER — APPOINTMENT (OUTPATIENT)
Dept: CARDIOTHORACIC SURGERY | Facility: CLINIC | Age: 89
End: 2024-05-06
Payer: MEDICARE

## 2024-05-06 ENCOUNTER — OUTPATIENT (OUTPATIENT)
Dept: OUTPATIENT SERVICES | Facility: HOSPITAL | Age: 89
LOS: 1 days | End: 2024-05-06
Payer: MEDICARE

## 2024-05-06 ENCOUNTER — RESULT REVIEW (OUTPATIENT)
Age: 89
End: 2024-05-06

## 2024-05-06 VITALS
SYSTOLIC BLOOD PRESSURE: 147 MMHG | HEIGHT: 59 IN | BODY MASS INDEX: 23.99 KG/M2 | DIASTOLIC BLOOD PRESSURE: 67 MMHG | TEMPERATURE: 97.2 F | HEART RATE: 64 BPM | OXYGEN SATURATION: 96 % | WEIGHT: 119 LBS

## 2024-05-06 DIAGNOSIS — I35.0 NONRHEUMATIC AORTIC (VALVE) STENOSIS: ICD-10-CM

## 2024-05-06 DIAGNOSIS — Z95.2 PRESENCE OF PROSTHETIC HEART VALVE: ICD-10-CM

## 2024-05-06 PROCEDURE — 93010 ELECTROCARDIOGRAM REPORT: CPT

## 2024-05-06 PROCEDURE — 99214 OFFICE O/P EST MOD 30 MIN: CPT

## 2024-05-06 PROCEDURE — 93306 TTE W/DOPPLER COMPLETE: CPT | Mod: 26

## 2024-05-07 PROBLEM — I35.0 AORTIC STENOSIS: Status: ACTIVE | Noted: 2024-01-17

## 2024-05-10 NOTE — REVIEW OF SYSTEMS
[Chest Discomfort] : chest discomfort [Negative] : Gastrointestinal [Tremor] : no tremor was seen [Numbness (Hypoesthesia)] : no numbness [Convulsions] : no convulsions [Tingling (Paresthesia)] : no tingling [Weakness] : no weakness [Limb Weakness (Paresis)] : no limb weakness (Paresis) [Speech Disturbance] : no speech disturbance

## 2024-05-10 NOTE — RESULTS/DATA
[TextEntry] :  ECHO 5/6/2024: The left ventricle is normal in size, wall thickness, and systolic function with a calculated ejection fraction of 65-70%.  23 mm Lise 3 Ultra valve is noted in the aortic position without any aortic regurgitation. The peak transvalvular velocity is 2.44 m/s, the mean transvalvular gradient is 13.00 mmHg, and the LVOT/AV velocity ratio is 0.43. The peak transaortic gradient is 23.81 mmHg. The aortic valve area (estimated via the continuity method) is 1.42 cm.  Structurally normal mitral valve with normal leaflet excursion. There is trace mitral regurgitation.  Structurally normal tricuspid valve with normal leaflet excursion. There is no evidence of tricuspid regurgitation.  Trivial pericardial effusion.  EKG 5/6/2024: Normal sinus rhythm  TTE 1/6/24: LVEF 55-60%, grade I diastolic dysfunction, normal RV function, trileaflet aortic valve, severe aortic stenosis, PG 87.8, MG 61.7, PV 4.68, ANUP (VTI) 0.6cm2, structurally normal mitral valve with no regurgitation, structurally normal tricuspid valve with no regurgitation, structurally normal pulmonic valve with no regurgitation, no e/o pericardial effusion.   EKG 1/6/24: SR 89, DC 145ms, QRS 98ms, QTc 395  KCCQ done on 1/22/4  5MWT 1/22/24: 50:37s, 49:40s, 50:10s  Post TAVR TTE on 3/21/24 showed: CONCLUSIONS:  1. Hyperdynamic left ventricular systolic function.  2. Normal right ventricular systolic function.  3. Mildly dilated left atrium.  4. 23mm Lise 3 Ultra TAVR valve is seen in the aortic position with normal function (PV 1.74, PG 12/MG 6, ANUP 1.95cm2), without evidence of prosthetic regurgitation.  5. No other significant valvular disease.  6. No pericardial effusion.

## 2024-05-10 NOTE — PLAN
[TextEntry] : Echo day of clinic: Normal RV/LV function, bioprosthetic valve in place, normal function, no AI.  gradients 13/24  (1) Return to [facility] in 3/3025 with ECHO/EKG for one year follow up (2) Continue cardiology care and medication management with Dr. Oliveira

## 2024-05-10 NOTE — END OF VISIT
[FreeTextEntry3] : I, Fallon Patton, am scribing for Dr. Radha Garcia the following sections: HISTORY OF PRESENT ILLNESS, PAST MEDICAL/FAMILY/SOCIAL HISTORY, REVIEW OF SYSTEMS, VITAL SIGNS, PHYSICAL EXAM AND DISPOSITION.   I personally performed the services described in the documentation and reviewed the documented recorded by the scribe in my presence; it accurately and completely records my words and actions.

## 2024-05-10 NOTE — HISTORY OF PRESENT ILLNESS
[FreeTextEntry1] : Referring cardiologist: Dr. Aparna Oliveira  Granddaughter, Humera, present  ID # [id number]  89F, Cantonese speaking, with PHM of anxiety, HTN, HLD, T2DM, PVD s/p vein procedure (in [place], 2010), chronic low back pain, HFpEF and symptomatic critical aortic stenosis (TTE 1/6/24: LVEF 55-60%, MG 61.7, PV 4.68, ANUP 0.6) s/p R-TF TAVR ([id number]) on 3/21/24 who presents for one month follow up.   On 3/21/24 patient underwent successful TF TAVR using a 23mm S3 Ultra THV via the right CFA with transient widening of her QRS intra-procedure but had return back to narrow complex upon arrival to the step-down unit. Course was notable for hypertension requiring PRN hydralazine x 1 post procedure. Post TAVR TTE showed normal function THV with no pericardial effusion. Patient was discharged home on 3/22/24 with MCOT.   MCOT reviewed that showed narrow QRS. No high grade AVB or pauses appreciated. Afib occurred with a 1% burden.    Since her last visit, the patient states she is having some chest discomfort and dizziness.  She has been having hypertension at home at times.  She denies shortness of breath at rest or with exertion, syncope, orthopnea, PND, or LE edema.   The patient is scheduled for ECHO/EKG today.

## 2024-05-10 NOTE — ASSESSMENT
[FreeTextEntry1] : 89F, Cantonese speaking, with PHM of anxiety, HTN, HLD, T2DM, PVD s/p vein procedure (in [place], 2010), chronic low back pain, HFpEF and symptomatic critical aortic stenosis (TTE 1/6/24: LVEF 55-60%, MG 61.7, PV 4.68, ANUP 0.6) s/p R-TF TAVR ([id number]) on 3/21/24 who presents for one month follow up.  The patient is clinically stable; NYHA Class II.  The ECHO was independently reviewed by Dr. Garcia which showed the 23mm Lise 3 Ultra TAVR valve is seen in the aortic position with normal function, without evidence of prosthetic regurgitation and a mean gradient of 13mmHg.  The results were discussed with the patient and her daughter.  Based on the ECHO results, Dr. Garcia does not believe her symptoms are related to her valve.  Based on the patient's symptoms, Dr. Garcia believes it is from hypertension vs. vertigo.  Dr. Garcia recommends the patient follow up with her cardiologist, Dr. Aparna Oliveira for medication management and her PCP for complaints of vertigo.  The patient and daughter verbalized understand.  The patient will follow up at [facility] in 3/2025 with ECHO/EKG for one year follow up.  The plan was discussed with the patient and her daughter.  All questions answered.

## 2024-05-28 NOTE — DISCHARGE NOTE PROVIDER - NSDCQMSTAIRS_GEN_ALL_CORE
DISPLAY PLAN FREE TEXT DISPLAY PLAN FREE TEXT DISPLAY PLAN FREE TEXT DISPLAY PLAN FREE TEXT DISPLAY PLAN FREE TEXT DISPLAY PLAN FREE TEXT DISPLAY PLAN FREE TEXT DISPLAY PLAN FREE TEXT DISPLAY PLAN FREE TEXT DISPLAY PLAN FREE TEXT DISPLAY PLAN FREE TEXT DISPLAY PLAN FREE TEXT DISPLAY PLAN FREE TEXT No

## 2024-08-16 NOTE — HISTORY OF PRESENT ILLNESS
[FreeTextEntry1] : Referring cardiologist: Dr. Aparna Oliveira  Granddaughter, Humera, present  ID #   [id number], Cantonese speaking, with PHM of anxiety, HTN, HLD, T2DM, PVD s/p vein procedure (in [place], 2010), chronic low back pain, HFpEF and symptomatic critical aortic stenosis (TTE 1/6/24: LVEF 55-60%, MG 61.7, PV 4.68, ANUP 0.6) s/p R-TF TAVR ([id number]) on 3/21/24 who presents for follow up.   On 3/21/24 patient underwent successful TF TAVR using a 23mm S3 Ultra THV via the right CFA with transient widening of her QRS intra-procedure but had return back to narrow complex upon arrival to the step-down unit. Course was notable for hypertension requiring PRN hydralazine x 1 post procedure. Post TAVR TTE showed normal function THV with no pericardial effusion. Patient was discharged home on 3/22/24 with MCOT.   MCOT reviewed that showed narrow QRS. No high grade AVB or pauses appreciated. Afib occurred with a 1% burden.    Since her last visit, the patient states...  The patient is scheduled for a CTA Cardiac today.

## 2024-08-16 NOTE — HISTORY OF PRESENT ILLNESS
[FreeTextEntry1] : Referring cardiologist: Dr. Aparna Oliveira  Granddaughter, Humera, present  ID #   [id number], Cantonese speaking, with PHM of anxiety, HTN, HLD, T2DM, PVD s/p vein procedure (in [place], 2010), chronic low back pain, HFpEF and symptomatic critical aortic stenosis (TTE 1/6/24: LVEF 55-60%, MG 61.7, PV 4.68, ANUP 0.6) s/p R-TF TAVR ([id number]) on 3/21/24 who presents for follow up.   On 3/21/24 patient underwent successful TF TAVR using a 23mm S3 Ultra THV via the right CFA with transient widening of her QRS intra-procedure but had return back to narrow complex upon arrival to the step-down unit. Course was notable for hypertension requiring PRN hydralazine x 1 post procedure. Post TAVR TTE showed normal function THV with no pericardial effusion. Patient was discharged home on 3/22/24 with MCOT.   MCOT reviewed that showed narrow QRS. No high grade AVB or pauses appreciated. Afib occurred with a 1% burden.    Since her last visit, the patient states...  The patient is scheduled for a CTA Cardiac today.

## 2024-08-16 NOTE — RESULTS/DATA
[TextEntry] :  ECHO 5/6/2024: The left ventricle is normal in size, wall thickness, and systolic function with a calculated ejection fraction of 65-70%.  23 mm Lise 3 Ultra valve is noted in the aortic position without any aortic regurgitation. The peak transvalvular velocity is 2.44 m/s, the mean transvalvular gradient is 13.00 mmHg, and the LVOT/AV velocity ratio is 0.43. The peak transaortic gradient is 23.81 mmHg. The aortic valve area (estimated via the continuity method) is 1.42 cm.  Structurally normal mitral valve with normal leaflet excursion. There is trace mitral regurgitation.  Structurally normal tricuspid valve with normal leaflet excursion. There is no evidence of tricuspid regurgitation.  Trivial pericardial effusion.  EKG 5/6/2024: Normal sinus rhythm  TTE 1/6/24: LVEF 55-60%, grade I diastolic dysfunction, normal RV function, trileaflet aortic valve, severe aortic stenosis, PG 87.8, MG 61.7, PV 4.68, ANUP (VTI) 0.6cm2, structurally normal mitral valve with no regurgitation, structurally normal tricuspid valve with no regurgitation, structurally normal pulmonic valve with no regurgitation, no e/o pericardial effusion.   EKG 1/6/24: SR 89, WV 145ms, QRS 98ms, QTc 395  KCCQ done on 1/22/4  5MWT 1/22/24: 50:37s, 49:40s, 50:10s  Post TAVR TTE on 3/21/24 showed: CONCLUSIONS:  1. Hyperdynamic left ventricular systolic function.  2. Normal right ventricular systolic function.  3. Mildly dilated left atrium.  4. 23mm Lise 3 Ultra TAVR valve is seen in the aortic position with normal function (PV 1.74, PG 12/MG 6, ANUP 1.95cm2), without evidence of prosthetic regurgitation.  5. No other significant valvular disease.  6. No pericardial effusion.

## 2024-08-16 NOTE — REVIEW OF SYSTEMS
[Chest Discomfort] : chest discomfort [Dizziness] : dizziness [Negative] : Heme/Lymph [Tremor] : no tremor was seen [Numbness (Hypoesthesia)] : no numbness [Convulsions] : no convulsions [Tingling (Paresthesia)] : no tingling [Weakness] : no weakness [Limb Weakness (Paresis)] : no limb weakness (Paresis) [Speech Disturbance] : no speech disturbance

## 2024-08-16 NOTE — RESULTS/DATA
[TextEntry] :  ECHO 5/6/2024: The left ventricle is normal in size, wall thickness, and systolic function with a calculated ejection fraction of 65-70%.  23 mm Lise 3 Ultra valve is noted in the aortic position without any aortic regurgitation. The peak transvalvular velocity is 2.44 m/s, the mean transvalvular gradient is 13.00 mmHg, and the LVOT/AV velocity ratio is 0.43. The peak transaortic gradient is 23.81 mmHg. The aortic valve area (estimated via the continuity method) is 1.42 cm.  Structurally normal mitral valve with normal leaflet excursion. There is trace mitral regurgitation.  Structurally normal tricuspid valve with normal leaflet excursion. There is no evidence of tricuspid regurgitation.  Trivial pericardial effusion.  EKG 5/6/2024: Normal sinus rhythm  TTE 1/6/24: LVEF 55-60%, grade I diastolic dysfunction, normal RV function, trileaflet aortic valve, severe aortic stenosis, PG 87.8, MG 61.7, PV 4.68, ANUP (VTI) 0.6cm2, structurally normal mitral valve with no regurgitation, structurally normal tricuspid valve with no regurgitation, structurally normal pulmonic valve with no regurgitation, no e/o pericardial effusion.   EKG 1/6/24: SR 89, KS 145ms, QRS 98ms, QTc 395  KCCQ done on 1/22/4  5MWT 1/22/24: 50:37s, 49:40s, 50:10s  Post TAVR TTE on 3/21/24 showed: CONCLUSIONS:  1. Hyperdynamic left ventricular systolic function.  2. Normal right ventricular systolic function.  3. Mildly dilated left atrium.  4. 23mm Lise 3 Ultra TAVR valve is seen in the aortic position with normal function (PV 1.74, PG 12/MG 6, ANUP 1.95cm2), without evidence of prosthetic regurgitation.  5. No other significant valvular disease.  6. No pericardial effusion.

## 2024-08-19 ENCOUNTER — APPOINTMENT (OUTPATIENT)
Dept: CT IMAGING | Facility: HOSPITAL | Age: 89
End: 2024-08-19

## 2024-08-19 ENCOUNTER — APPOINTMENT (OUTPATIENT)
Dept: CARDIOTHORACIC SURGERY | Facility: CLINIC | Age: 89
End: 2024-08-19

## 2024-09-30 ENCOUNTER — APPOINTMENT (OUTPATIENT)
Dept: CT IMAGING | Facility: HOSPITAL | Age: 89
End: 2024-09-30

## 2024-09-30 ENCOUNTER — OUTPATIENT (OUTPATIENT)
Dept: OUTPATIENT SERVICES | Facility: HOSPITAL | Age: 89
LOS: 1 days | End: 2024-09-30
Payer: MEDICARE

## 2024-09-30 ENCOUNTER — APPOINTMENT (OUTPATIENT)
Dept: CARDIOTHORACIC SURGERY | Facility: CLINIC | Age: 89
End: 2024-09-30
Payer: MEDICARE

## 2024-09-30 ENCOUNTER — RESULT REVIEW (OUTPATIENT)
Age: 89
End: 2024-09-30

## 2024-09-30 VITALS
SYSTOLIC BLOOD PRESSURE: 139 MMHG | OXYGEN SATURATION: 97 % | DIASTOLIC BLOOD PRESSURE: 64 MMHG | BODY MASS INDEX: 23.18 KG/M2 | HEIGHT: 59 IN | WEIGHT: 115 LBS | HEART RATE: 72 BPM | TEMPERATURE: 97.5 F

## 2024-09-30 DIAGNOSIS — I35.8 INFECTION AND INFLAMMATORY REACTION DUE TO CARDIAC VALVE PROSTHESIS, INITIAL ENCOUNTER: ICD-10-CM

## 2024-09-30 DIAGNOSIS — T82.6XXA INFECTION AND INFLAMMATORY REACTION DUE TO CARDIAC VALVE PROSTHESIS, INITIAL ENCOUNTER: ICD-10-CM

## 2024-09-30 DIAGNOSIS — Z95.2 PRESENCE OF PROSTHETIC HEART VALVE: ICD-10-CM

## 2024-09-30 LAB — POCT ISTAT CREATININE: 1.4 MG/DL — HIGH (ref 0.5–1.3)

## 2024-09-30 PROCEDURE — 99214 OFFICE O/P EST MOD 30 MIN: CPT

## 2024-09-30 PROCEDURE — 75573 CT HRT C+ STRUX CGEN HRT DS: CPT

## 2024-09-30 PROCEDURE — 93306 TTE W/DOPPLER COMPLETE: CPT | Mod: 26

## 2024-09-30 PROCEDURE — 75573 CT HRT C+ STRUX CGEN HRT DS: CPT | Mod: 26,MH

## 2024-09-30 PROCEDURE — 93005 ELECTROCARDIOGRAM TRACING: CPT

## 2024-09-30 PROCEDURE — 93306 TTE W/DOPPLER COMPLETE: CPT

## 2024-09-30 PROCEDURE — 82565 ASSAY OF CREATININE: CPT

## 2024-09-30 RX ORDER — ASCORBIC ACID, THIAMINE MONONITRATE,RIBOFLAVIN, NIACINAMIDE, PYRIDOXINE HYDROCHLORIDE, FOLIC ACID, CYANOCOBALAMIN, BIOTIN, CALCIUM PANTOTHENATE, 100; 1.5; 1.7; 20; 10; 1; 6000; 150000; 5 MG/1; MG/1; MG/1; MG/1; MG/1; MG/1; UG/1; UG/1; MG/1
CAPSULE, LIQUID FILLED ORAL
Refills: 0 | Status: ACTIVE | COMMUNITY

## 2024-09-30 RX ORDER — AMLODIPINE BESYLATE 5 MG/1
5 TABLET ORAL
Refills: 0 | Status: ACTIVE | COMMUNITY

## 2024-09-30 RX ORDER — HYDROCHLOROTHIAZIDE 12.5 MG/1
12.5 TABLET ORAL DAILY
Refills: 0 | Status: ACTIVE | COMMUNITY

## 2024-09-30 RX ORDER — LOSARTAN POTASSIUM 100 MG/1
100 TABLET, FILM COATED ORAL
Refills: 0 | Status: ACTIVE | COMMUNITY

## 2024-10-01 ENCOUNTER — APPOINTMENT (OUTPATIENT)
Dept: CARDIOTHORACIC SURGERY | Facility: CLINIC | Age: 89
End: 2024-10-01

## 2024-10-02 PROBLEM — T82.6XXA: Status: ACTIVE | Noted: 2024-10-02

## 2024-10-02 RX ORDER — APIXABAN 2.5 MG/1
2.5 TABLET, FILM COATED ORAL
Qty: 60 | Refills: 6 | Status: ACTIVE | COMMUNITY
Start: 2024-10-02 | End: 1900-01-01

## 2024-10-03 NOTE — PHYSICAL EXAM
[Normal Conjunctiva] : normal conjunctiva [No Murmur] : no murmur [No Rub] : no rub [No Gallop] : no gallop [Good Air Entry] : good air entry [No Cyanosis] : no cyanosis [No Clubbing] : no clubbing [No Skin Lesions] : no skin lesions [No Focal Deficits] : no focal deficits [Normal memory] : normal memory [de-identified] : supple

## 2024-10-03 NOTE — ASSESSMENT
[FreeTextEntry1] : 89F, Cantonese speaking, with PHM of anxiety, HTN, HLD, T2DM, PVD s/p vein procedure (in [place], 2010), chronic low back pain, HFpEF and symptomatic critical aortic stenosis s/p R-TF TAVR ([id number]) on 3/21/24 who presents for follow up. Dr. Padilla have independently seen and evaluated the patient in this face-to-face encounter. Patient is clinically stable, NYHA Class II. Echocardiogram today showed normal BiV function, normal function aortic THV without PVL (MG 17), compared to 5/6/24 TTE, findings are similar. Cardiac CTA today showed mild leaflet thickening of the right coronary cusp of the aortic THV. Results discussed with patient and her family. Dr. Padilla discussed findings with Dr. Oliveira with a consensus decision to start patient on Eliquis 2.5 mg BID and will have a follow up TTE in 1 month at Dr. Oliveira's office. Then on her 1-year post TAVR follow up, we will also obtain a cardiac CTA at that time to reassess her aortic THV. Lastly, patient was asked to follow up with her PCP for renal function monitoring. Reminded on the need for IE Abx ppx prior to dental procedure. All questions were addressed.

## 2024-10-03 NOTE — PLAN
[TextEntry] : - stop ASA, start Eliquis 2.5 mg BID.  - IE Abx ppx 30-60 min prior to dental procedure. - follow up with Dr. Oliveira with an echo in 1 month (patient and family preference).  - follow up with PCP for ongoing health maintenance and renal function monitoring.  - return to [facility] in March 2025 with Echo/EKG and cardiac CTA for her one-year post TAVR follow up or as needed.   I, Dr. Kartik Padilla, personally performed the evaluation and management (E/M) services for this established patient who presents today with (a) new problem(s)/exacerbation of (an) existing condition(s). That E/M includes conducting the clinically appropriate interval history &/or exam, assessing all new/exacerbated conditions, and establishing a new plan of care. Today, my BISMARK, noted herewith, was here to observe my evaluation and management service for this new problem/exacerbated condition and follow the plan of care established by me going forward.

## 2024-10-03 NOTE — RESULTS/DATA
[TextEntry] : ECHO 5/6/2024: The left ventricle is normal in size, wall thickness, and systolic function with a calculated ejection fraction of 65-70%.  23 mm Lise 3 Ultra valve is noted in the aortic position without any aortic regurgitation. The peak transvalvular velocity is 2.44 m/s, the mean transvalvular gradient is 13.00 mmHg, and the LVOT/AV velocity ratio is 0.43. The peak transaortic gradient is 23.81 mmHg. The aortic valve area (estimated via the continuity method) is 1.42 cm.  Structurally normal mitral valve with normal leaflet excursion. There is trace mitral regurgitation.  Structurally normal tricuspid valve with normal leaflet excursion. There is no evidence of tricuspid regurgitation.  Trivial pericardial effusion.  EKG 5/6/2024: Normal sinus rhythm  TTE 1/6/24: LVEF 55-60%, grade I diastolic dysfunction, normal RV function, trileaflet aortic valve, severe aortic stenosis, PG 87.8, MG 61.7, PV 4.68, ANUP (VTI) 0.6cm2, structurally normal mitral valve with no regurgitation, structurally normal tricuspid valve with no regurgitation, structurally normal pulmonic valve with no regurgitation, no e/o pericardial effusion.   EKG 1/6/24: SR 89, NV 145ms, QRS 98ms, QTc 395  KCCQ done on 1/22/4  5MWT 1/22/24: 50:37s, 49:40s, 50:10s  Post TAVR TTE on 3/21/24 showed: CONCLUSIONS:  1. Hyperdynamic left ventricular systolic function.  2. Normal right ventricular systolic function.  3. Mildly dilated left atrium.  4. 23mm Lise 3 Ultra TAVR valve is seen in the aortic position with normal function (PV 1.74, PG 12/MG 6, ANUP 1.95cm2), without evidence of prosthetic regurgitation.  5. No other significant valvular disease.  6. No pericardial effusion.  TTE 5/6/24 CONCLUSIONS:  1. Normal left ventricular systolic function.  2. Normal right ventricular systolic function.  3. 23 mm Lise 3 Ultra valve is noted in the aortic position without any aortic regurgitation. The peak transvalvular velocity is 2.44 m/s, the mean transvalvular gradient is 13.00 mmHg, and the LVOT/AV velocity ratio is 0.43. The peak transaortic gradient is 23.81 mmHg. The aortic valve area (estimated via the continuity method) is 1.42 cm.  4. No other significant valvular disease.  5. No pericardial effusion.  6. Compared to the previous TTE performed on 3/21/2024, there have been no significant interval changes.  TTE 9/30/24 CONCLUSIONS:  1. Normal left and right ventricular size and systolic function.  2. Mild symmetric left ventricular hypertrophy.  3. Normal atria.  4. 23 mm Lsie 3 Ultra valve is noted in the aortic position without any aortic regurgitation. The peak transvalvular velocity is 2.54 m/s, the mean transvalvular gradient is 17.00 mmHg, and the LVOT/AV velocity ratio is 0.43. The peak transaortic gradient is 25.81 mmHg.  5. No other significant valvular disease.  6. No evidence of pulmonary hypertension.  7. No pericardial effusion.  8. Compared to the previous TTE performed on 5/6/2024, findings are similar.  Cardiac CTA 9/24/24 IMPRESSION: 1.  Nonobstructive coronary artery disease. 2.  Mid RCA not well visualized due to motion. 3.  Calcific plaque obscures the lumen Mid- Distal LAD, proximal LCX, proximal and distal RCA.  precluding stenosis evaluation. 4.  TVAR (Lise 3 Ultra) in aortic position. There is mild leaflet thickening of the right coronary cusp.

## 2024-10-03 NOTE — PHYSICAL EXAM
[Normal Conjunctiva] : normal conjunctiva [No Murmur] : no murmur [No Rub] : no rub [No Gallop] : no gallop [Good Air Entry] : good air entry [No Cyanosis] : no cyanosis [No Clubbing] : no clubbing [No Skin Lesions] : no skin lesions [No Focal Deficits] : no focal deficits [Normal memory] : normal memory [de-identified] : supple

## 2024-10-03 NOTE — ASSESSMENT
[FreeTextEntry1] : 89F, Cantonese speaking, with PHM of anxiety, HTN, HLD, T2DM, PVD s/p vein procedure (in [place], 2010), chronic low back pain, HFpEF and symptomatic critical aortic stenosis s/p R-TF TAVR ([id number]) on 3/21/24 who presents for follow up. Dr. Padilla have independently seen and evaluated the patient in this face-to-face encounter. Patient is clinically stable, NYHA Class II. Echocardiogram today showed normal BiV function, normal function aortic THV without PVL (MG 17), compared to 5/6/24 TTE, findings are similar. Cardiac CTA today showed mild leaflet thickening of the right coronary cusp of the aortic THV. Results discussed with patient and her family. Dr. Padilla discussed findings with Dr. Oliveira with a consensus decision to start patient on Eliquis 2.5 mg BID and will have a follow up TTE in 1 month at Dr. Oliveira's office. Then on her 1-year post TAVR follow up, we will also obtain a cardiac CTA at that time to reassess her aortic THV. Lastly, patient was asked to follow up with her PCP for renal function monitoring. Reminded on the need for IE Abx ppx prior to dental procedure. All questions were addressed.

## 2024-10-03 NOTE — HISTORY OF PRESENT ILLNESS
[FreeTextEntry1] : Referring cardiologist: Dr. Aparna Oliveira  Granddaughter, Humera, present who helped with interpretation.  89F, Cantonese speaking, with PHM of anxiety, HTN, HLD, T2DM, PVD s/p vein procedure (in [place], 2010), chronic low back pain, HFpEF and symptomatic critical aortic stenosis (TTE 1/6/24: LVEF 55-60%, MG 61.7, PV 4.68, ANUP 0.6) s/p R-TF TAVR ([id number]) on 3/21/24 who presents for follow up.   On 3/21/24 patient underwent successful TF TAVR using a 23mm S3 Ultra THV via the right CFA with transient widening of her QRS intra-procedure but had return back to narrow complex upon arrival to the step-down unit. Course was notable for hypertension requiring PRN hydralazine x 1 post procedure. Post TAVR TTE showed normal function THV with no pericardial effusion. Patient was discharged home on 3/22/24 with MCOT.   MCOT reviewed that showed narrow QRS. No high grade AVB or pauses appreciated. Afib occurred with a 1% burden.    Seen in [facility] on 5/6/24 for her 1 month follow up at which time patient reported atypical chest pain and dizziness in the setting of hypertension. TTE on 5/6/24 showed normal BiV function, normal functioning aortic THV now PVL with MG 13.  Echo at [facility] on 7/19/24: Conclusion: Normal LV systolic function with visual EF 55-60%. Doppler evidence of grade I (impaired) diastolic dysfunction. Bioprosthetic (TAVR) trileaflet aortic valve. Aortic valve area (VTI) measures 1.4 cm^2. AV Mean Grad measures 22.6 mmg. AV Pk Joshua measures 3.01 m/s. Mild (Grade I) mitral regurgitation. Mild tricuspid regurgitation. TR Peak Gradient is 20.1 mmHg.   Since her last visit, patient feel "ok", has generalized "all over the body" pain. Energy is "ok".  Denies any recent shortness of breath, palpitations, lightheadedness/dizziness or syncope, orthopnea, PND, LE edema, abdominal bloating, fever, chills, dysuria, active dental issue, or recent hospitalizations.

## 2024-10-03 NOTE — PLAN
[TextEntry] : - stop ASA, start Eliquis 2.5 mg BID.  - IE Abx ppx 30-60 min prior to dental procedure. - follow up with Dr. Oliveira with an echo in 1 month (patient and family preference).  - follow up with PCP for ongoing health maintenance and renal function monitoring.  - return to [facility] in March 2025 with Echo/EKG and cardiac CTA for her one-year post TAVR follow up or as needed.   I, Dr. Kartik Padilla, personally performed the evaluation and management (E/M) services for this established patient who presents today with (a) new problem(s)/exacerbation of (an) existing condition(s). That E/M includes conducting the clinically appropriate interval history &/or exam, assessing all new/exacerbated conditions, and establishing a new plan of care. Today, my BISMARK, noted herewith, was here to observe my evaluation and management service for this new problem/exacerbated condition and follow the plan of care established by me going forward.

## 2024-10-03 NOTE — ASSESSMENT
[FreeTextEntry1] : 89F, Cantonese speaking, with PHM of anxiety, HTN, HLD, T2DM, PVD s/p vein procedure (in [place], 2010), chronic low back pain, HFpEF and symptomatic critical aortic stenosis s/p R-TF TAVR ([id number]) on 3/21/24 who presents for follow up. Dr. Padilla have independently seen and evaluated the patient in this face-to-face encounter. Patient is clinically stable, NYHA Class II. Echocardiogram today showed normal BiV function, normal function aortic THV without PVL (MG 17), compared to 5/6/24 TTE, findings are similar. Cardiac CTA today showed mild leaflet thickening of the right coronary cusp of the aortic THV. Results discussed with patient and her family. Dr. Padilla discussed findings with Dr. Oliveira with a consensus decision to start patient on Eliquis 2.5 mg BID and will have a follow up TTE in 1 month at Dr. Oliveira's office. Then on her 1-year post TAVR follow up, we will also obtain a cardiac CTA at that time to reassess her aortic THV. Lastly, patient was asked to follow up with her PCP for renal function monitoring. Reminded on the need for IE Abx ppx prior to dental procedure. All questions were addressed.

## 2024-10-03 NOTE — RESULTS/DATA
[TextEntry] : ECHO 5/6/2024: The left ventricle is normal in size, wall thickness, and systolic function with a calculated ejection fraction of 65-70%.  23 mm Lise 3 Ultra valve is noted in the aortic position without any aortic regurgitation. The peak transvalvular velocity is 2.44 m/s, the mean transvalvular gradient is 13.00 mmHg, and the LVOT/AV velocity ratio is 0.43. The peak transaortic gradient is 23.81 mmHg. The aortic valve area (estimated via the continuity method) is 1.42 cm.  Structurally normal mitral valve with normal leaflet excursion. There is trace mitral regurgitation.  Structurally normal tricuspid valve with normal leaflet excursion. There is no evidence of tricuspid regurgitation.  Trivial pericardial effusion.  EKG 5/6/2024: Normal sinus rhythm  TTE 1/6/24: LVEF 55-60%, grade I diastolic dysfunction, normal RV function, trileaflet aortic valve, severe aortic stenosis, PG 87.8, MG 61.7, PV 4.68, ANUP (VTI) 0.6cm2, structurally normal mitral valve with no regurgitation, structurally normal tricuspid valve with no regurgitation, structurally normal pulmonic valve with no regurgitation, no e/o pericardial effusion.   EKG 1/6/24: SR 89, MI 145ms, QRS 98ms, QTc 395  KCCQ done on 1/22/4  5MWT 1/22/24: 50:37s, 49:40s, 50:10s  Post TAVR TTE on 3/21/24 showed: CONCLUSIONS:  1. Hyperdynamic left ventricular systolic function.  2. Normal right ventricular systolic function.  3. Mildly dilated left atrium.  4. 23mm Lise 3 Ultra TAVR valve is seen in the aortic position with normal function (PV 1.74, PG 12/MG 6, ANUP 1.95cm2), without evidence of prosthetic regurgitation.  5. No other significant valvular disease.  6. No pericardial effusion.  TTE 5/6/24 CONCLUSIONS:  1. Normal left ventricular systolic function.  2. Normal right ventricular systolic function.  3. 23 mm Lise 3 Ultra valve is noted in the aortic position without any aortic regurgitation. The peak transvalvular velocity is 2.44 m/s, the mean transvalvular gradient is 13.00 mmHg, and the LVOT/AV velocity ratio is 0.43. The peak transaortic gradient is 23.81 mmHg. The aortic valve area (estimated via the continuity method) is 1.42 cm.  4. No other significant valvular disease.  5. No pericardial effusion.  6. Compared to the previous TTE performed on 3/21/2024, there have been no significant interval changes.  TTE 9/30/24 CONCLUSIONS:  1. Normal left and right ventricular size and systolic function.  2. Mild symmetric left ventricular hypertrophy.  3. Normal atria.  4. 23 mm Lise 3 Ultra valve is noted in the aortic position without any aortic regurgitation. The peak transvalvular velocity is 2.54 m/s, the mean transvalvular gradient is 17.00 mmHg, and the LVOT/AV velocity ratio is 0.43. The peak transaortic gradient is 25.81 mmHg.  5. No other significant valvular disease.  6. No evidence of pulmonary hypertension.  7. No pericardial effusion.  8. Compared to the previous TTE performed on 5/6/2024, findings are similar.  Cardiac CTA 9/24/24 IMPRESSION: 1.  Nonobstructive coronary artery disease. 2.  Mid RCA not well visualized due to motion. 3.  Calcific plaque obscures the lumen Mid- Distal LAD, proximal LCX, proximal and distal RCA.  precluding stenosis evaluation. 4.  TVAR (Lise 3 Ultra) in aortic position. There is mild leaflet thickening of the right coronary cusp.

## 2024-10-03 NOTE — PLAN
[TextEntry] : - stop ASA, start Eliquis 2.5 mg BID.  - IE Abx ppx 30-60 min prior to dental procedure. - follow up with Dr. Oliveira with an echo in 1 month (patient and family preference).  - follow up with PCP for ongoing health maintenance and renal function monitoring.  - return to [facility] in March 2025 with Echo/EKG and cardiac CTA for her one-year post TAVR follow up or as needed.   I, Dr. Kartik Padilla, personally performed the evaluation and management (E/M) services for this established patient who presents today with (a) new problem(s)/exacerbation of (an) existing condition(s). That E/M includes conducting the clinically appropriate interval history &/or exam, assessing all new/exacerbated conditions, and establishing a new plan of care. Today, my BISMARK, noted herewith, was here to observe my evaluation and management service for this new problem/exacerbated condition and follow the plan of care established by me going forward.

## 2024-10-03 NOTE — RESULTS/DATA
[TextEntry] : ECHO 5/6/2024: The left ventricle is normal in size, wall thickness, and systolic function with a calculated ejection fraction of 65-70%.  23 mm Lise 3 Ultra valve is noted in the aortic position without any aortic regurgitation. The peak transvalvular velocity is 2.44 m/s, the mean transvalvular gradient is 13.00 mmHg, and the LVOT/AV velocity ratio is 0.43. The peak transaortic gradient is 23.81 mmHg. The aortic valve area (estimated via the continuity method) is 1.42 cm.  Structurally normal mitral valve with normal leaflet excursion. There is trace mitral regurgitation.  Structurally normal tricuspid valve with normal leaflet excursion. There is no evidence of tricuspid regurgitation.  Trivial pericardial effusion.  EKG 5/6/2024: Normal sinus rhythm  TTE 1/6/24: LVEF 55-60%, grade I diastolic dysfunction, normal RV function, trileaflet aortic valve, severe aortic stenosis, PG 87.8, MG 61.7, PV 4.68, ANUP (VTI) 0.6cm2, structurally normal mitral valve with no regurgitation, structurally normal tricuspid valve with no regurgitation, structurally normal pulmonic valve with no regurgitation, no e/o pericardial effusion.   EKG 1/6/24: SR 89, MT 145ms, QRS 98ms, QTc 395  KCCQ done on 1/22/4  5MWT 1/22/24: 50:37s, 49:40s, 50:10s  Post TAVR TTE on 3/21/24 showed: CONCLUSIONS:  1. Hyperdynamic left ventricular systolic function.  2. Normal right ventricular systolic function.  3. Mildly dilated left atrium.  4. 23mm Lise 3 Ultra TAVR valve is seen in the aortic position with normal function (PV 1.74, PG 12/MG 6, ANUP 1.95cm2), without evidence of prosthetic regurgitation.  5. No other significant valvular disease.  6. No pericardial effusion.  TTE 5/6/24 CONCLUSIONS:  1. Normal left ventricular systolic function.  2. Normal right ventricular systolic function.  3. 23 mm Lise 3 Ultra valve is noted in the aortic position without any aortic regurgitation. The peak transvalvular velocity is 2.44 m/s, the mean transvalvular gradient is 13.00 mmHg, and the LVOT/AV velocity ratio is 0.43. The peak transaortic gradient is 23.81 mmHg. The aortic valve area (estimated via the continuity method) is 1.42 cm.  4. No other significant valvular disease.  5. No pericardial effusion.  6. Compared to the previous TTE performed on 3/21/2024, there have been no significant interval changes.  TTE 9/30/24 CONCLUSIONS:  1. Normal left and right ventricular size and systolic function.  2. Mild symmetric left ventricular hypertrophy.  3. Normal atria.  4. 23 mm Lise 3 Ultra valve is noted in the aortic position without any aortic regurgitation. The peak transvalvular velocity is 2.54 m/s, the mean transvalvular gradient is 17.00 mmHg, and the LVOT/AV velocity ratio is 0.43. The peak transaortic gradient is 25.81 mmHg.  5. No other significant valvular disease.  6. No evidence of pulmonary hypertension.  7. No pericardial effusion.  8. Compared to the previous TTE performed on 5/6/2024, findings are similar.  Cardiac CTA 9/24/24 IMPRESSION: 1.  Nonobstructive coronary artery disease. 2.  Mid RCA not well visualized due to motion. 3.  Calcific plaque obscures the lumen Mid- Distal LAD, proximal LCX, proximal and distal RCA.  precluding stenosis evaluation. 4.  TVAR (Lise 3 Ultra) in aortic position. There is mild leaflet thickening of the right coronary cusp.

## 2024-10-03 NOTE — REVIEW OF SYSTEMS
[Negative] : Cardiovascular [Dizziness] : no dizziness [Tremor] : no tremor was seen [Numbness (Hypoesthesia)] : no numbness [Convulsions] : no convulsions [Tingling (Paresthesia)] : no tingling [Weakness] : no weakness [Limb Weakness (Paresis)] : no limb weakness (Paresis) [Speech Disturbance] : no speech disturbance [FreeTextEntry1] : has generalized pain

## 2024-10-03 NOTE — HISTORY OF PRESENT ILLNESS
[FreeTextEntry1] : Referring cardiologist: Dr. Aparna Oliveira  Granddaughter, Humera, present who helped with interpretation.  89F, Cantonese speaking, with PHM of anxiety, HTN, HLD, T2DM, PVD s/p vein procedure (in [place], 2010), chronic low back pain, HFpEF and symptomatic critical aortic stenosis (TTE 1/6/24: LVEF 55-60%, MG 61.7, PV 4.68, ANUP 0.6) s/p R-TF TAVR ([id number]) on 3/21/24 who presents for follow up.   On 3/21/24 patient underwent successful TF TAVR using a 23mm S3 Ultra THV via the right CFA with transient widening of her QRS intra-procedure but had return back to narrow complex upon arrival to the step-down unit. Course was notable for hypertension requiring PRN hydralazine x 1 post procedure. Post TAVR TTE showed normal function THV with no pericardial effusion. Patient was discharged home on 3/22/24 with MCOT.   MCOT reviewed that showed narrow QRS. No high grade AVB or pauses appreciated. Afib occurred with a 1% burden.    Seen in [facility] on 5/6/24 for her 1 month follow up at which time patient reported atypical chest pain and dizziness in the setting of hypertension. TTE on 5/6/24 showed normal BiV function, normal functioning aortic THV now PVL with MG 13.  Echo at [facility] on 7/19/24: Conclusion: Normal LV systolic function with visual EF 55-60%. Doppler evidence of grade I (impaired) diastolic dysfunction. Bioprosthetic (TAVR) trileaflet aortic valve. Aortic valve area (VTI) measures 1.4 cm^2. AV Mean Grad measures 22.6 mmg. AV Pk Joshua measures 3.01 m/s. Mild (Grade I) mitral regurgitation. Mild tricuspid regurgitation. TR Peak Gradient is 20.1 mmHg.   Since her last visit, patient feel "ok", has generalized "all over the body" pain. Energy is "ok".  Denies any recent shortness of breath, palpitations, lightheadedness/dizziness or syncope, orthopnea, PND, LE edema, abdominal bloating, fever, chills, dysuria, active dental issue, or recent hospitalizations.

## 2024-10-03 NOTE — PHYSICAL EXAM
[Normal Conjunctiva] : normal conjunctiva [No Murmur] : no murmur [No Rub] : no rub [No Gallop] : no gallop [Good Air Entry] : good air entry [No Cyanosis] : no cyanosis [No Clubbing] : no clubbing [No Skin Lesions] : no skin lesions [No Focal Deficits] : no focal deficits [Normal memory] : normal memory [de-identified] : supple

## 2024-10-03 NOTE — HISTORY OF PRESENT ILLNESS
[FreeTextEntry1] : Referring cardiologist: Dr. Aparna Oliveira  Granddaughter, Humera, present who helped with interpretation.  89F, Cantonese speaking, with PHM of anxiety, HTN, HLD, T2DM, PVD s/p vein procedure (in [place], 2010), chronic low back pain, HFpEF and symptomatic critical aortic stenosis (TTE 1/6/24: LVEF 55-60%, MG 61.7, PV 4.68, ANUP 0.6) s/p R-TF TAVR ([id number]) on 3/21/24 who presents for follow up.   On 3/21/24 patient underwent successful TF TAVR using a 23mm S3 Ultra THV via the right CFA with transient widening of her QRS intra-procedure but had return back to narrow complex upon arrival to the step-down unit. Course was notable for hypertension requiring PRN hydralazine x 1 post procedure. Post TAVR TTE showed normal function THV with no pericardial effusion. Patient was discharged home on 3/22/24 with MCOT.   MCOT reviewed that showed narrow QRS. No high grade AVB or pauses appreciated. Afib occurred with a 1% burden.    Seen in [facility] on 5/6/24 for her 1 month follow up at which time patient reported atypical chest pain and dizziness in the setting of hypertension. TTE on 5/6/24 showed normal BiV function, normal functioning aortic THV now PVL with MG 13.  Echo at [facility] on 7/19/24: Conclusion: Normal LV systolic function with visual EF 55-60%. Doppler evidence of grade I (impaired) diastolic dysfunction. Bioprosthetic (TAVR) trileaflet aortic valve. Aortic valve area (VTI) measures 1.4 cm^2. AV Mean Grad measures 22.6 mmg. AV Pk Joshua measures 3.01 m/s. Mild (Grade I) mitral regurgitation. Mild tricuspid regurgitation. TR Peak Gradient is 20.1 mmHg.   Since her last visit, patient feel "ok", has generalized "all over the body" pain. Energy is "ok".  Denies any recent shortness of breath, palpitations, lightheadedness/dizziness or syncope, orthopnea, PND, LE edema, abdominal bloating, fever, chills, dysuria, active dental issue, or recent hospitalizations.

## 2024-10-03 NOTE — REASON FOR VISIT
[Family Member] : family member [FreeTextEntry1] : Accompanied by her daughter, Melisa, and granddaughter, Humera.   services offered but patient and her family prefer Humera, who is an RN, to perform translation.

## 2025-03-31 ENCOUNTER — OUTPATIENT (OUTPATIENT)
Dept: OUTPATIENT SERVICES | Facility: HOSPITAL | Age: 89
LOS: 1 days | End: 2025-03-31
Payer: MEDICARE

## 2025-03-31 ENCOUNTER — APPOINTMENT (OUTPATIENT)
Dept: CARDIOTHORACIC SURGERY | Facility: CLINIC | Age: 89
End: 2025-03-31
Payer: MEDICARE

## 2025-03-31 ENCOUNTER — NON-APPOINTMENT (OUTPATIENT)
Age: 89
End: 2025-03-31

## 2025-03-31 ENCOUNTER — RESULT REVIEW (OUTPATIENT)
Age: 89
End: 2025-03-31

## 2025-03-31 ENCOUNTER — APPOINTMENT (OUTPATIENT)
Dept: CT IMAGING | Facility: HOSPITAL | Age: 89
End: 2025-03-31

## 2025-03-31 VITALS
HEART RATE: 60 BPM | WEIGHT: 120 LBS | SYSTOLIC BLOOD PRESSURE: 146 MMHG | TEMPERATURE: 96.7 F | DIASTOLIC BLOOD PRESSURE: 67 MMHG | OXYGEN SATURATION: 98 % | HEIGHT: 57 IN | BODY MASS INDEX: 25.89 KG/M2

## 2025-03-31 DIAGNOSIS — I35.0 NONRHEUMATIC AORTIC (VALVE) STENOSIS: ICD-10-CM

## 2025-03-31 PROCEDURE — 36415 COLL VENOUS BLD VENIPUNCTURE: CPT

## 2025-03-31 PROCEDURE — 82565 ASSAY OF CREATININE: CPT

## 2025-03-31 PROCEDURE — 93306 TTE W/DOPPLER COMPLETE: CPT | Mod: 26

## 2025-03-31 PROCEDURE — 93306 TTE W/DOPPLER COMPLETE: CPT

## 2025-03-31 PROCEDURE — 99213 OFFICE O/P EST LOW 20 MIN: CPT

## 2025-03-31 PROCEDURE — 85025 COMPLETE CBC W/AUTO DIFF WBC: CPT

## 2025-03-31 PROCEDURE — 80053 COMPREHEN METABOLIC PANEL: CPT

## (undated) DEVICE — BAND RADIAL COMPRESSION DEVICE REG 24CM

## (undated) DEVICE — PACING CABLE (BROWN) A/V TEMP SCREW DOWN 12FT

## (undated) DEVICE — WARMING BLANKET FULL UNDERBODY

## (undated) DEVICE — PACK HYBRID LHH

## (undated) DEVICE — SYR MED A2000 SYRINGE KIT ACIST REUS

## (undated) DEVICE — Device

## (undated) DEVICE — NDL PERCU ECHOTIP 21G X 4CM

## (undated) DEVICE — TUBING EXTENSION HI PRESSURE FLEX 48"

## (undated) DEVICE — DRSG QUICKCLOT HEMOSTATIC 4X4 FOIL

## (undated) DEVICE — ELCTR ZOLL DEFIBRILLATOR PAD NO REPLACEMENT

## (undated) DEVICE — DRAPE ULTRASOUND TRANSDUCER COVER

## (undated) DEVICE — SYS DEL CONTROLLER ANGIOTOUCH

## (undated) DEVICE — PACING CABLE (BLUE) ATRIAL TEMP SCREW DOWN 12FT

## (undated) DEVICE — MANIFOLD ANGIO AUTOMD TRNDCR

## (undated) DEVICE — TUBING IV EXTENSION 30"

## (undated) DEVICE — CATH CARDIAC RT CUSET 601201319

## (undated) DEVICE — SUT HOLDER INSERT FOR OCTOBASE STERNAL RETRACTOR